# Patient Record
Sex: MALE | Race: ASIAN | NOT HISPANIC OR LATINO | URBAN - METROPOLITAN AREA
[De-identification: names, ages, dates, MRNs, and addresses within clinical notes are randomized per-mention and may not be internally consistent; named-entity substitution may affect disease eponyms.]

---

## 2020-10-22 ENCOUNTER — OUTPATIENT (OUTPATIENT)
Dept: OUTPATIENT SERVICES | Facility: HOSPITAL | Age: 55
LOS: 1 days | End: 2020-10-22
Payer: MEDICAID

## 2020-10-22 ENCOUNTER — APPOINTMENT (OUTPATIENT)
Dept: DISASTER EMERGENCY | Facility: CLINIC | Age: 55
End: 2020-10-22

## 2020-10-22 VITALS
OXYGEN SATURATION: 98 % | SYSTOLIC BLOOD PRESSURE: 153 MMHG | DIASTOLIC BLOOD PRESSURE: 84 MMHG | WEIGHT: 216.93 LBS | TEMPERATURE: 98 F | RESPIRATION RATE: 15 BRPM | HEART RATE: 81 BPM | HEIGHT: 72.5 IN

## 2020-10-22 DIAGNOSIS — S99.821A OTHER SPECIFIED INJURIES OF RIGHT FOOT, INITIAL ENCOUNTER: ICD-10-CM

## 2020-10-22 DIAGNOSIS — S90.851A SUPERFICIAL FOREIGN BODY, RIGHT FOOT, INITIAL ENCOUNTER: ICD-10-CM

## 2020-10-22 DIAGNOSIS — Z01.818 ENCOUNTER FOR OTHER PREPROCEDURAL EXAMINATION: ICD-10-CM

## 2020-10-22 LAB
ALBUMIN SERPL ELPH-MCNC: 3.9 G/DL — SIGNIFICANT CHANGE UP (ref 3.3–5)
ALP SERPL-CCNC: 54 U/L — SIGNIFICANT CHANGE UP (ref 40–120)
ALT FLD-CCNC: 24 U/L — SIGNIFICANT CHANGE UP (ref 12–78)
ANION GAP SERPL CALC-SCNC: 6 MMOL/L — SIGNIFICANT CHANGE UP (ref 5–17)
AST SERPL-CCNC: 15 U/L — SIGNIFICANT CHANGE UP (ref 15–37)
BILIRUB SERPL-MCNC: 0.5 MG/DL — SIGNIFICANT CHANGE UP (ref 0.2–1.2)
BUN SERPL-MCNC: 11 MG/DL — SIGNIFICANT CHANGE UP (ref 7–23)
CALCIUM SERPL-MCNC: 9.1 MG/DL — SIGNIFICANT CHANGE UP (ref 8.5–10.1)
CHLORIDE SERPL-SCNC: 106 MMOL/L — SIGNIFICANT CHANGE UP (ref 96–108)
CO2 SERPL-SCNC: 27 MMOL/L — SIGNIFICANT CHANGE UP (ref 22–31)
CREAT SERPL-MCNC: 0.94 MG/DL — SIGNIFICANT CHANGE UP (ref 0.5–1.3)
GLUCOSE SERPL-MCNC: 145 MG/DL — HIGH (ref 70–99)
HCT VFR BLD CALC: 44 % — SIGNIFICANT CHANGE UP (ref 39–50)
HGB BLD-MCNC: 15.3 G/DL — SIGNIFICANT CHANGE UP (ref 13–17)
MCHC RBC-ENTMCNC: 29.3 PG — SIGNIFICANT CHANGE UP (ref 27–34)
MCHC RBC-ENTMCNC: 34.8 GM/DL — SIGNIFICANT CHANGE UP (ref 32–36)
MCV RBC AUTO: 84.1 FL — SIGNIFICANT CHANGE UP (ref 80–100)
NRBC # BLD: 0 /100 WBCS — SIGNIFICANT CHANGE UP (ref 0–0)
PLATELET # BLD AUTO: 180 K/UL — SIGNIFICANT CHANGE UP (ref 150–400)
POTASSIUM SERPL-MCNC: 3.7 MMOL/L — SIGNIFICANT CHANGE UP (ref 3.5–5.3)
POTASSIUM SERPL-SCNC: 3.7 MMOL/L — SIGNIFICANT CHANGE UP (ref 3.5–5.3)
PROT SERPL-MCNC: 8 G/DL — SIGNIFICANT CHANGE UP (ref 6–8.3)
RBC # BLD: 5.23 M/UL — SIGNIFICANT CHANGE UP (ref 4.2–5.8)
RBC # FLD: 12.6 % — SIGNIFICANT CHANGE UP (ref 10.3–14.5)
SODIUM SERPL-SCNC: 139 MMOL/L — SIGNIFICANT CHANGE UP (ref 135–145)
WBC # BLD: 9.17 K/UL — SIGNIFICANT CHANGE UP (ref 3.8–10.5)
WBC # FLD AUTO: 9.17 K/UL — SIGNIFICANT CHANGE UP (ref 3.8–10.5)

## 2020-10-22 PROCEDURE — 36415 COLL VENOUS BLD VENIPUNCTURE: CPT

## 2020-10-22 PROCEDURE — 85027 COMPLETE CBC AUTOMATED: CPT

## 2020-10-22 PROCEDURE — 93005 ELECTROCARDIOGRAM TRACING: CPT

## 2020-10-22 PROCEDURE — 83036 HEMOGLOBIN GLYCOSYLATED A1C: CPT

## 2020-10-22 PROCEDURE — G0463: CPT

## 2020-10-22 PROCEDURE — 93010 ELECTROCARDIOGRAM REPORT: CPT

## 2020-10-22 PROCEDURE — 80053 COMPREHEN METABOLIC PANEL: CPT

## 2020-10-22 NOTE — H&P PST ADULT - ASSESSMENT
56 yo M for removal foreign body right foot     Medical clearance pending 56 yo M for removal foreign body right foot     Medical clearance pending   Addendum: medically optimized. Scheduled for removal of foreign body from right foot with Dr Wagner on 10/28/20.

## 2020-10-22 NOTE — H&P PST ADULT - NSICDXPROBLEM_GEN_ALL_CORE_FT
PROBLEM DIAGNOSES  Problem: Foreign body in right foot  Assessment and Plan: for removal of FB

## 2020-10-23 PROBLEM — E11.9 TYPE 2 DIABETES MELLITUS WITHOUT COMPLICATIONS: Chronic | Status: ACTIVE | Noted: 2020-10-22

## 2020-10-23 PROBLEM — T14.8XXA OTHER INJURY OF UNSPECIFIED BODY REGION, INITIAL ENCOUNTER: Chronic | Status: ACTIVE | Noted: 2020-10-22

## 2020-10-23 LAB
A1C WITH ESTIMATED AVERAGE GLUCOSE RESULT: 7 % — HIGH (ref 4–5.6)
ESTIMATED AVERAGE GLUCOSE: 154 MG/DL — HIGH (ref 68–114)
SARS-COV-2 N GENE NPH QL NAA+PROBE: NOT DETECTED

## 2020-10-25 ENCOUNTER — APPOINTMENT (OUTPATIENT)
Dept: DISASTER EMERGENCY | Facility: CLINIC | Age: 55
End: 2020-10-25

## 2020-10-26 LAB — SARS-COV-2 N GENE NPH QL NAA+PROBE: NOT DETECTED

## 2020-10-27 ENCOUNTER — TRANSCRIPTION ENCOUNTER (OUTPATIENT)
Age: 55
End: 2020-10-27

## 2020-10-28 ENCOUNTER — RESULT REVIEW (OUTPATIENT)
Age: 55
End: 2020-10-28

## 2020-10-28 ENCOUNTER — OUTPATIENT (OUTPATIENT)
Dept: OUTPATIENT SERVICES | Facility: HOSPITAL | Age: 55
LOS: 1 days | End: 2020-10-28
Payer: MEDICAID

## 2020-10-28 VITALS
HEIGHT: 72 IN | SYSTOLIC BLOOD PRESSURE: 168 MMHG | DIASTOLIC BLOOD PRESSURE: 86 MMHG | TEMPERATURE: 98 F | WEIGHT: 220.02 LBS | OXYGEN SATURATION: 100 % | HEART RATE: 92 BPM | RESPIRATION RATE: 14 BRPM

## 2020-10-28 VITALS
OXYGEN SATURATION: 96 % | DIASTOLIC BLOOD PRESSURE: 77 MMHG | HEART RATE: 75 BPM | TEMPERATURE: 99 F | RESPIRATION RATE: 16 BRPM | SYSTOLIC BLOOD PRESSURE: 114 MMHG

## 2020-10-28 DIAGNOSIS — S99.821A OTHER SPECIFIED INJURIES OF RIGHT FOOT, INITIAL ENCOUNTER: ICD-10-CM

## 2020-10-28 DIAGNOSIS — S90.851A SUPERFICIAL FOREIGN BODY, RIGHT FOOT, INITIAL ENCOUNTER: ICD-10-CM

## 2020-10-28 PROCEDURE — 88300 SURGICAL PATH GROSS: CPT | Mod: 26

## 2020-10-28 PROCEDURE — 10120 INC&RMVL FB SUBQ TISS SMPL: CPT

## 2020-10-28 PROCEDURE — 88300 SURGICAL PATH GROSS: CPT

## 2020-10-28 PROCEDURE — 82962 GLUCOSE BLOOD TEST: CPT

## 2020-10-28 PROCEDURE — 76000 FLUOROSCOPY <1 HR PHYS/QHP: CPT

## 2020-10-28 RX ORDER — HYDROMORPHONE HYDROCHLORIDE 2 MG/ML
0.5 INJECTION INTRAMUSCULAR; INTRAVENOUS; SUBCUTANEOUS
Refills: 0 | Status: DISCONTINUED | OUTPATIENT
Start: 2020-10-28 | End: 2020-10-28

## 2020-10-28 RX ORDER — SODIUM CHLORIDE 9 MG/ML
1000 INJECTION, SOLUTION INTRAVENOUS
Refills: 0 | Status: DISCONTINUED | OUTPATIENT
Start: 2020-10-28 | End: 2020-10-28

## 2020-10-28 RX ORDER — OXYCODONE HYDROCHLORIDE 5 MG/1
5 TABLET ORAL ONCE
Refills: 0 | Status: DISCONTINUED | OUTPATIENT
Start: 2020-10-28 | End: 2020-10-28

## 2020-10-28 RX ORDER — SODIUM CHLORIDE 9 MG/ML
1000 INJECTION, SOLUTION INTRAVENOUS
Refills: 0 | Status: DISCONTINUED | OUTPATIENT
Start: 2020-10-28 | End: 2020-11-11

## 2020-10-28 RX ORDER — CEFAZOLIN SODIUM 1 G
2000 VIAL (EA) INJECTION ONCE
Refills: 0 | Status: DISCONTINUED | OUTPATIENT
Start: 2020-10-28 | End: 2020-10-28

## 2020-10-28 RX ORDER — ONDANSETRON 8 MG/1
4 TABLET, FILM COATED ORAL ONCE
Refills: 0 | Status: DISCONTINUED | OUTPATIENT
Start: 2020-10-28 | End: 2020-10-28

## 2020-10-28 RX ADMIN — SODIUM CHLORIDE 75 MILLILITER(S): 9 INJECTION, SOLUTION INTRAVENOUS at 07:13

## 2020-10-28 RX ADMIN — SODIUM CHLORIDE 75 MILLILITER(S): 9 INJECTION, SOLUTION INTRAVENOUS at 13:03

## 2020-10-28 NOTE — BRIEF OPERATIVE NOTE - COMMENTS
Patient tolerated procedure and anesthesia well and was transferred to the recovery room with vital signs and neurovascular status intact to the right foot. Patient to be discharged to home when stable per anesthesia

## 2020-10-28 NOTE — ASU DISCHARGE PLAN (ADULT/PEDIATRIC) - CALL YOUR DOCTOR IF YOU HAVE ANY OF THE FOLLOWING:
Bleeding that does not stop/Pain not relieved by Medications/Fever greater than (need to indicate Fahrenheit or Celsius)/Wound/Surgical Site with redness, or foul smelling discharge or pus/Numbness, tingling, color or temperature change to extremity/Nausea and vomiting that does not stop/Excessive diarrhea

## 2020-10-28 NOTE — ASU DISCHARGE PLAN (ADULT/PEDIATRIC) - ASU DC SPECIAL INSTRUCTIONSFT
-Patient is to follow up on 11/2/20- Monday in Dr. Wagner office in [place]  - Patient is to remain weight bearing as tolerated in surgical shoe to the right foot  -Patient is to take Rx Cefadroxil 500mg QID x10 days and Ultram 50mg which will be sent electronically by Dr. Wagner to the patient's pharmacy  - Patient is to report to the ED if any signs of systemic infection arise and persist including but not limited to nausea, vomiting, fever  -Patient is to keep the dressing clean, dry and intact to the right lower extremity until first follow up appointment

## 2020-10-28 NOTE — ASU DISCHARGE PLAN (ADULT/PEDIATRIC) - CARE PROVIDER_API CALL
Sebas Wagner  Podiatric Medicine and Surgery  [address] [place] [address], [address]  [place] [address]  Phone: [phone]  Fax: [phone]  Follow Up Time: 1 week

## 2021-03-02 ENCOUNTER — INPATIENT (INPATIENT)
Facility: HOSPITAL | Age: 56
LOS: 2 days | Discharge: HOME | DRG: 247 | End: 2021-03-05
Attending: INTERNAL MEDICINE | Admitting: INTERNAL MEDICINE
Payer: MEDICAID

## 2021-03-02 VITALS
SYSTOLIC BLOOD PRESSURE: 160 MMHG | TEMPERATURE: 98 F | RESPIRATION RATE: 20 BRPM | WEIGHT: 220.9 LBS | HEART RATE: 106 BPM | OXYGEN SATURATION: 100 % | DIASTOLIC BLOOD PRESSURE: 91 MMHG

## 2021-03-02 DIAGNOSIS — I20.0 UNSTABLE ANGINA: ICD-10-CM

## 2021-03-02 LAB
ALBUMIN SERPL ELPH-MCNC: 4.6 G/DL — SIGNIFICANT CHANGE UP (ref 3.3–5)
ALP SERPL-CCNC: 50 U/L — SIGNIFICANT CHANGE UP (ref 40–120)
ALT FLD-CCNC: 13 U/L — SIGNIFICANT CHANGE UP (ref 10–45)
ANION GAP SERPL CALC-SCNC: 13 MMOL/L — SIGNIFICANT CHANGE UP (ref 5–17)
APTT BLD: 30.6 SEC — SIGNIFICANT CHANGE UP (ref 27.5–35.5)
AST SERPL-CCNC: 13 U/L — SIGNIFICANT CHANGE UP (ref 10–40)
BASOPHILS # BLD AUTO: 0.04 K/UL — SIGNIFICANT CHANGE UP (ref 0–0.2)
BASOPHILS NFR BLD AUTO: 0.5 % — SIGNIFICANT CHANGE UP (ref 0–2)
BILIRUB SERPL-MCNC: 0.4 MG/DL — SIGNIFICANT CHANGE UP (ref 0.2–1.2)
BLD GP AB SCN SERPL QL: NEGATIVE — SIGNIFICANT CHANGE UP
BUN SERPL-MCNC: 7 MG/DL — SIGNIFICANT CHANGE UP (ref 7–23)
CALCIUM SERPL-MCNC: 10.3 MG/DL — SIGNIFICANT CHANGE UP (ref 8.4–10.5)
CHLORIDE SERPL-SCNC: 100 MMOL/L — SIGNIFICANT CHANGE UP (ref 96–108)
CK MB CFR SERPL CALC: 2.4 NG/ML — SIGNIFICANT CHANGE UP (ref 0–6.7)
CK SERPL-CCNC: 57 U/L — SIGNIFICANT CHANGE UP (ref 30–200)
CO2 SERPL-SCNC: 26 MMOL/L — SIGNIFICANT CHANGE UP (ref 22–31)
CREAT SERPL-MCNC: 0.86 MG/DL — SIGNIFICANT CHANGE UP (ref 0.5–1.3)
EOSINOPHIL # BLD AUTO: 0.14 K/UL — SIGNIFICANT CHANGE UP (ref 0–0.5)
EOSINOPHIL NFR BLD AUTO: 1.6 % — SIGNIFICANT CHANGE UP (ref 0–6)
GLUCOSE BLDC GLUCOMTR-MCNC: 167 MG/DL — HIGH (ref 70–99)
GLUCOSE BLDC GLUCOMTR-MCNC: 184 MG/DL — HIGH (ref 70–99)
GLUCOSE SERPL-MCNC: 151 MG/DL — HIGH (ref 70–99)
HCT VFR BLD CALC: 44.6 % — SIGNIFICANT CHANGE UP (ref 39–50)
HGB BLD-MCNC: 14.8 G/DL — SIGNIFICANT CHANGE UP (ref 13–17)
IMM GRANULOCYTES NFR BLD AUTO: 0.1 % — SIGNIFICANT CHANGE UP (ref 0–1.5)
INR BLD: 1.03 RATIO — SIGNIFICANT CHANGE UP (ref 0.88–1.16)
LYMPHOCYTES # BLD AUTO: 2.54 K/UL — SIGNIFICANT CHANGE UP (ref 1–3.3)
LYMPHOCYTES # BLD AUTO: 29.8 % — SIGNIFICANT CHANGE UP (ref 13–44)
MCHC RBC-ENTMCNC: 28.7 PG — SIGNIFICANT CHANGE UP (ref 27–34)
MCHC RBC-ENTMCNC: 33.2 GM/DL — SIGNIFICANT CHANGE UP (ref 32–36)
MCV RBC AUTO: 86.4 FL — SIGNIFICANT CHANGE UP (ref 80–100)
MONOCYTES # BLD AUTO: 0.68 K/UL — SIGNIFICANT CHANGE UP (ref 0–0.9)
MONOCYTES NFR BLD AUTO: 8 % — SIGNIFICANT CHANGE UP (ref 2–14)
NEUTROPHILS # BLD AUTO: 5.1 K/UL — SIGNIFICANT CHANGE UP (ref 1.8–7.4)
NEUTROPHILS NFR BLD AUTO: 60 % — SIGNIFICANT CHANGE UP (ref 43–77)
NRBC # BLD: 0 /100 WBCS — SIGNIFICANT CHANGE UP (ref 0–0)
PLATELET # BLD AUTO: 144 K/UL — LOW (ref 150–400)
POTASSIUM SERPL-MCNC: 3.5 MMOL/L — SIGNIFICANT CHANGE UP (ref 3.5–5.3)
POTASSIUM SERPL-SCNC: 3.5 MMOL/L — SIGNIFICANT CHANGE UP (ref 3.5–5.3)
PROT SERPL-MCNC: 7.5 G/DL — SIGNIFICANT CHANGE UP (ref 6–8.3)
PROTHROM AB SERPL-ACNC: 12.3 SEC — SIGNIFICANT CHANGE UP (ref 10.6–13.6)
RBC # BLD: 5.16 M/UL — SIGNIFICANT CHANGE UP (ref 4.2–5.8)
RBC # FLD: 12.5 % — SIGNIFICANT CHANGE UP (ref 10.3–14.5)
RH IG SCN BLD-IMP: POSITIVE — SIGNIFICANT CHANGE UP
SARS-COV-2 RNA SPEC QL NAA+PROBE: SIGNIFICANT CHANGE UP
SODIUM SERPL-SCNC: 139 MMOL/L — SIGNIFICANT CHANGE UP (ref 135–145)
TROPONIN T, HIGH SENSITIVITY RESULT: 21 NG/L — SIGNIFICANT CHANGE UP (ref 0–51)
WBC # BLD: 8.51 K/UL — SIGNIFICANT CHANGE UP (ref 3.8–10.5)
WBC # FLD AUTO: 8.51 K/UL — SIGNIFICANT CHANGE UP (ref 3.8–10.5)

## 2021-03-02 PROCEDURE — 93010 ELECTROCARDIOGRAM REPORT: CPT | Mod: 59

## 2021-03-02 PROCEDURE — 93458 L HRT ARTERY/VENTRICLE ANGIO: CPT | Mod: 26

## 2021-03-02 PROCEDURE — 71045 X-RAY EXAM CHEST 1 VIEW: CPT | Mod: 26

## 2021-03-02 PROCEDURE — 99152 MOD SED SAME PHYS/QHP 5/>YRS: CPT

## 2021-03-02 PROCEDURE — 93010 ELECTROCARDIOGRAM REPORT: CPT

## 2021-03-02 PROCEDURE — 99222 1ST HOSP IP/OBS MODERATE 55: CPT

## 2021-03-02 PROCEDURE — 99285 EMERGENCY DEPT VISIT HI MDM: CPT

## 2021-03-02 RX ORDER — DEXTROSE 50 % IN WATER 50 %
25 SYRINGE (ML) INTRAVENOUS ONCE
Refills: 0 | Status: DISCONTINUED | OUTPATIENT
Start: 2021-03-02 | End: 2021-03-03

## 2021-03-02 RX ORDER — DEXTROSE 50 % IN WATER 50 %
12.5 SYRINGE (ML) INTRAVENOUS ONCE
Refills: 0 | Status: DISCONTINUED | OUTPATIENT
Start: 2021-03-02 | End: 2021-03-03

## 2021-03-02 RX ORDER — GLUCAGON INJECTION, SOLUTION 0.5 MG/.1ML
1 INJECTION, SOLUTION SUBCUTANEOUS ONCE
Refills: 0 | Status: DISCONTINUED | OUTPATIENT
Start: 2021-03-02 | End: 2021-03-03

## 2021-03-02 RX ORDER — ATORVASTATIN CALCIUM 80 MG/1
40 TABLET, FILM COATED ORAL AT BEDTIME
Refills: 0 | Status: DISCONTINUED | OUTPATIENT
Start: 2021-03-02 | End: 2021-03-05

## 2021-03-02 RX ORDER — ASPIRIN/CALCIUM CARB/MAGNESIUM 324 MG
81 TABLET ORAL DAILY
Refills: 0 | Status: DISCONTINUED | OUTPATIENT
Start: 2021-03-02 | End: 2021-03-05

## 2021-03-02 RX ORDER — SODIUM CHLORIDE 9 MG/ML
1000 INJECTION, SOLUTION INTRAVENOUS
Refills: 0 | Status: DISCONTINUED | OUTPATIENT
Start: 2021-03-02 | End: 2021-03-03

## 2021-03-02 RX ORDER — INSULIN LISPRO 100/ML
VIAL (ML) SUBCUTANEOUS AT BEDTIME
Refills: 0 | Status: DISCONTINUED | OUTPATIENT
Start: 2021-03-02 | End: 2021-03-03

## 2021-03-02 RX ORDER — INFLUENZA VIRUS VACCINE 15; 15; 15; 15 UG/.5ML; UG/.5ML; UG/.5ML; UG/.5ML
0.5 SUSPENSION INTRAMUSCULAR ONCE
Refills: 0 | Status: COMPLETED | OUTPATIENT
Start: 2021-03-02 | End: 2021-03-02

## 2021-03-02 RX ORDER — PANTOPRAZOLE SODIUM 20 MG/1
40 TABLET, DELAYED RELEASE ORAL
Refills: 0 | Status: DISCONTINUED | OUTPATIENT
Start: 2021-03-02 | End: 2021-03-05

## 2021-03-02 RX ORDER — DEXTROSE 50 % IN WATER 50 %
15 SYRINGE (ML) INTRAVENOUS ONCE
Refills: 0 | Status: DISCONTINUED | OUTPATIENT
Start: 2021-03-02 | End: 2021-03-03

## 2021-03-02 RX ORDER — FAMOTIDINE 10 MG/ML
20 INJECTION INTRAVENOUS DAILY
Refills: 0 | Status: DISCONTINUED | OUTPATIENT
Start: 2021-03-02 | End: 2021-03-05

## 2021-03-02 RX ORDER — ASPIRIN/CALCIUM CARB/MAGNESIUM 324 MG
162 TABLET ORAL ONCE
Refills: 0 | Status: COMPLETED | OUTPATIENT
Start: 2021-03-02 | End: 2021-03-02

## 2021-03-02 RX ADMIN — Medication 162 MILLIGRAM(S): at 16:42

## 2021-03-02 RX ADMIN — ATORVASTATIN CALCIUM 40 MILLIGRAM(S): 80 TABLET, FILM COATED ORAL at 21:41

## 2021-03-02 NOTE — ED PROVIDER NOTE - NS ED ROS FT
GENERAL: No fever or chills, EYES: no change in vision, HEENT: no trouble swallowing or speaking, CARDIAC: no chest pain, PULMONARY: no cough or SOB, GI: no abdominal pain, no nausea, no vomiting, no diarrhea or constipation, : No changes in urination, SKIN: no rashes, NEURO: no headache,  MSK: No joint pain ~Jan Rios M.D. Resident

## 2021-03-02 NOTE — H&P CARDIOLOGY - HISTORY OF PRESENT ILLNESS
56 yo M with PMhx DM presented to ED after being sent by his cardiologist Dr Tamia Khan for EKG changes. Patient has been c/o chest pain for 3 weeks and attributed it to heart burn stating it was relieved by pepcid.     No fever, chills, No N/V/D or abdominal pain. COVID negative 3/2 .  56 yo M with PMhx DM presented to ED after being sent by his cardiologist Dr Tamia Khan for EKG changes. Patient has been c/o chest pain for 3 weeks and attributed it to heart burn stating it was relieved by pepcid.  He was recently on Z-pack for fever chills and congestion, He states he was tested for COVID x 3 and all were negative .  He followed with  for his CP  heart burn and said his EKG was abnormal and was sent to the ED.     No fever, chills, No N/V/D or abdominal pain. COVID negative 3/2 .

## 2021-03-02 NOTE — ED PROVIDER NOTE - PHYSICAL EXAMINATION
Gen: AAOx3, non-toxic  Head: NCAT  HEENT: EOMI, oral mucosa moist, normal conjunctiva  Lung: CTAB, no respiratory distress, no wheezes/rhonchi/rales B/L, speaking in full sentences  CV: RRR, no murmurs, rubs or gallops  Abd: soft, NTND, no guarding  MSK: no visible deformities  Neuro: No focal sensory or motor deficits  Skin: Warm, well perfused, no rash  Psych: normal affect.   ~Jan Rios M.D. Resident

## 2021-03-02 NOTE — ED PROVIDER NOTE - OBJECTIVE STATEMENT
ID: [id number]  55yM Cristian speaking h/o diabetes presents from outpatient cardiology (  [phone]) for abnormal EKG. YESSENIA in II/III/AVF. Patient states that he has been having heart burn for the past 3 weeks worsening with food in which have been improving with pepcid. Presented to Dr. Nieto office for routine check up to make sure everything was fine. No chest pain  ID: [id number]  55yM Cristian speaking h/o diabetes presents from outpatient cardiology (  [phone]) for abnormal EKG. YESSENIA in II/III/AVF. Patient states that he has been having heart burn for the past 3 weeks worsening with food in which have been improving with pepcid. Presented to Dr. Nieto office for routine check up to make sure everything was fine. No chest pain,, abd pain, sob, back pain, urinary or bowel irregularities.

## 2021-03-02 NOTE — ED PROVIDER NOTE - PROGRESS NOTE DETAILS
Gabriel HDZ: Spoke with  who states that he spoke with Dr Peter and recommended patient to come to the ED for Cath,

## 2021-03-02 NOTE — ED ADULT NURSE NOTE - OBJECTIVE STATEMENT
55 y.o male pmh DM, GERD, presents to ED for abnormal EKG. Per pt, he has been having routine outpatient visits for his "stomach aches" and had abnormal EKG and sent to ED. CM applied, EKG done showing possible inferior wall MI. Denies CP, SOB, n/v/d, fevers, chills, abdominal pain, urinary symptoms, weakness, fatigue, numbness, tingling in upper and lower extremities, HA, blurry vision. Updated on plan of care.

## 2021-03-02 NOTE — ED PROVIDER NOTE - CLINICAL SUMMARY MEDICAL DECISION MAKING FREE TEXT BOX
Impression:  patient's presentation mandates workup as ACS until proven otherwise (heartburn + abnormal ECG that is significantly different from prior on file fro m10/22/2020).  Patient's ECG does not meet STEMI criteria, but he will likely need admit for further cardiac workup.  Plan:  cardiac labs, ASA, INR, CXR, reassess.   COVID swab.

## 2021-03-02 NOTE — ED PROVIDER NOTE - ATTENDING CONTRIBUTION TO CARE
MD Casiano:  patient seen and evaluated with the resident.  I was present for key portions of the History & Physical, and I agree with the Impression & Plan.  MD Casiano:  56 yo M, sent to ED by outside provider for eval abdominal pain (heartburn) + abnormal ECG.  Patient was sent to ED with old ECG from 10/22/2020, and there is significant interval change.  Associated Sx: no CP/SOB, no n/v/d  Quality: "heartburn."  Duration 3wks  Modifiers: POs; no change in symptoms with exertion.  VS: wnl.  Physical Exam: adult M, NAD, NCAT, PERRL, EOMI, neck supple, RRR, CTA B, Abd: s/nd/nt, Ext: no edema, Neuro: AAOx3, ambulates w/o diff, strength 5/5 & symmetric throughout.  ECG:  +qwaves inferiorly, with associated deep TWI.    Impression:  patient's presentation mandates workup as ACS until proven otherwise (heartburn + abnormal ECG that is significantly different from prior on file fro m10/22/2020).  Patient's ECG does not meet STEMI criteria, but he will likely need admit for further cardiac workup.  Plan:  cardiac labs, ASA, INR, CXR, reassess.   COVID swab.

## 2021-03-02 NOTE — ED ADULT NURSE NOTE - NSIMPLEMENTINTERV_GEN_ALL_ED
Implemented All Universal Safety Interventions:  [place] to call system. Call bell, personal items and telephone within reach. Instruct patient to call for assistance. Room bathroom lighting operational. Non-slip footwear when patient is off stretcher. Physically safe environment: no spills, clutter or unnecessary equipment. Stretcher in lowest position, wheels locked, appropriate side rails in place.

## 2021-03-03 LAB
A1C WITH ESTIMATED AVERAGE GLUCOSE RESULT: 6.9 % — HIGH (ref 4–5.6)
ANION GAP SERPL CALC-SCNC: 15 MMOL/L — SIGNIFICANT CHANGE UP (ref 5–17)
BUN SERPL-MCNC: 6 MG/DL — LOW (ref 7–23)
CALCIUM SERPL-MCNC: 9.9 MG/DL — SIGNIFICANT CHANGE UP (ref 8.4–10.5)
CHLORIDE SERPL-SCNC: 99 MMOL/L — SIGNIFICANT CHANGE UP (ref 96–108)
CHOLEST SERPL-MCNC: 194 MG/DL — SIGNIFICANT CHANGE UP
CO2 SERPL-SCNC: 24 MMOL/L — SIGNIFICANT CHANGE UP (ref 22–31)
CREAT SERPL-MCNC: 0.69 MG/DL — SIGNIFICANT CHANGE UP (ref 0.5–1.3)
ESTIMATED AVERAGE GLUCOSE: 151 MG/DL — HIGH (ref 68–114)
GLUCOSE BLDC GLUCOMTR-MCNC: 148 MG/DL — HIGH (ref 70–99)
GLUCOSE BLDC GLUCOMTR-MCNC: 165 MG/DL — HIGH (ref 70–99)
GLUCOSE BLDC GLUCOMTR-MCNC: 170 MG/DL — HIGH (ref 70–99)
GLUCOSE BLDC GLUCOMTR-MCNC: 193 MG/DL — HIGH (ref 70–99)
GLUCOSE BLDC GLUCOMTR-MCNC: 196 MG/DL — HIGH (ref 70–99)
GLUCOSE SERPL-MCNC: 150 MG/DL — HIGH (ref 70–99)
HCT VFR BLD CALC: 43.1 % — SIGNIFICANT CHANGE UP (ref 39–50)
HDLC SERPL-MCNC: 37 MG/DL — LOW
HGB BLD-MCNC: 14.2 G/DL — SIGNIFICANT CHANGE UP (ref 13–17)
LIPID PNL WITH DIRECT LDL SERPL: 134 MG/DL — HIGH
MCHC RBC-ENTMCNC: 28.4 PG — SIGNIFICANT CHANGE UP (ref 27–34)
MCHC RBC-ENTMCNC: 32.9 GM/DL — SIGNIFICANT CHANGE UP (ref 32–36)
MCV RBC AUTO: 86.2 FL — SIGNIFICANT CHANGE UP (ref 80–100)
NON HDL CHOLESTEROL: 157 MG/DL — HIGH
NRBC # BLD: 0 /100 WBCS — SIGNIFICANT CHANGE UP (ref 0–0)
PLATELET # BLD AUTO: 125 K/UL — LOW (ref 150–400)
POTASSIUM SERPL-MCNC: 3.6 MMOL/L — SIGNIFICANT CHANGE UP (ref 3.5–5.3)
POTASSIUM SERPL-SCNC: 3.6 MMOL/L — SIGNIFICANT CHANGE UP (ref 3.5–5.3)
RBC # BLD: 5 M/UL — SIGNIFICANT CHANGE UP (ref 4.2–5.8)
RBC # FLD: 12.5 % — SIGNIFICANT CHANGE UP (ref 10.3–14.5)
SODIUM SERPL-SCNC: 138 MMOL/L — SIGNIFICANT CHANGE UP (ref 135–145)
TRIGL SERPL-MCNC: 114 MG/DL — SIGNIFICANT CHANGE UP
WBC # BLD: 8.87 K/UL — SIGNIFICANT CHANGE UP (ref 3.8–10.5)
WBC # FLD AUTO: 8.87 K/UL — SIGNIFICANT CHANGE UP (ref 3.8–10.5)

## 2021-03-03 PROCEDURE — 93306 TTE W/DOPPLER COMPLETE: CPT | Mod: 26

## 2021-03-03 RX ORDER — METOPROLOL TARTRATE 50 MG
12.5 TABLET ORAL
Refills: 0 | Status: DISCONTINUED | OUTPATIENT
Start: 2021-03-03 | End: 2021-03-05

## 2021-03-03 RX ORDER — GLUCAGON INJECTION, SOLUTION 0.5 MG/.1ML
1 INJECTION, SOLUTION SUBCUTANEOUS ONCE
Refills: 0 | Status: DISCONTINUED | OUTPATIENT
Start: 2021-03-03 | End: 2021-03-05

## 2021-03-03 RX ORDER — DEXTROSE 50 % IN WATER 50 %
25 SYRINGE (ML) INTRAVENOUS ONCE
Refills: 0 | Status: DISCONTINUED | OUTPATIENT
Start: 2021-03-03 | End: 2021-03-05

## 2021-03-03 RX ORDER — INSULIN LISPRO 100/ML
VIAL (ML) SUBCUTANEOUS AT BEDTIME
Refills: 0 | Status: DISCONTINUED | OUTPATIENT
Start: 2021-03-03 | End: 2021-03-05

## 2021-03-03 RX ORDER — TICAGRELOR 90 MG/1
90 TABLET ORAL EVERY 12 HOURS
Refills: 0 | Status: DISCONTINUED | OUTPATIENT
Start: 2021-03-03 | End: 2021-03-05

## 2021-03-03 RX ORDER — DEXTROSE 50 % IN WATER 50 %
12.5 SYRINGE (ML) INTRAVENOUS ONCE
Refills: 0 | Status: DISCONTINUED | OUTPATIENT
Start: 2021-03-03 | End: 2021-03-05

## 2021-03-03 RX ORDER — FAMOTIDINE 10 MG/ML
1 INJECTION INTRAVENOUS
Qty: 0 | Refills: 0 | DISCHARGE

## 2021-03-03 RX ORDER — SODIUM CHLORIDE 9 MG/ML
1000 INJECTION, SOLUTION INTRAVENOUS
Refills: 0 | Status: DISCONTINUED | OUTPATIENT
Start: 2021-03-03 | End: 2021-03-05

## 2021-03-03 RX ORDER — INSULIN LISPRO 100/ML
VIAL (ML) SUBCUTANEOUS
Refills: 0 | Status: DISCONTINUED | OUTPATIENT
Start: 2021-03-03 | End: 2021-03-05

## 2021-03-03 RX ORDER — DEXTROSE 50 % IN WATER 50 %
15 SYRINGE (ML) INTRAVENOUS ONCE
Refills: 0 | Status: DISCONTINUED | OUTPATIENT
Start: 2021-03-03 | End: 2021-03-05

## 2021-03-03 RX ADMIN — FAMOTIDINE 20 MILLIGRAM(S): 10 INJECTION INTRAVENOUS at 11:02

## 2021-03-03 RX ADMIN — Medication 81 MILLIGRAM(S): at 05:24

## 2021-03-03 RX ADMIN — PANTOPRAZOLE SODIUM 40 MILLIGRAM(S): 20 TABLET, DELAYED RELEASE ORAL at 05:25

## 2021-03-03 RX ADMIN — Medication 12.5 MILLIGRAM(S): at 17:05

## 2021-03-03 RX ADMIN — Medication 12.5 MILLIGRAM(S): at 11:03

## 2021-03-03 RX ADMIN — Medication 1: at 11:43

## 2021-03-03 RX ADMIN — TICAGRELOR 90 MILLIGRAM(S): 90 TABLET ORAL at 21:06

## 2021-03-03 RX ADMIN — TICAGRELOR 90 MILLIGRAM(S): 90 TABLET ORAL at 11:03

## 2021-03-03 RX ADMIN — ATORVASTATIN CALCIUM 40 MILLIGRAM(S): 80 TABLET, FILM COATED ORAL at 21:06

## 2021-03-03 RX ADMIN — Medication 1: at 16:42

## 2021-03-04 ENCOUNTER — TRANSCRIPTION ENCOUNTER (OUTPATIENT)
Age: 56
End: 2021-03-04

## 2021-03-04 LAB
GLUCOSE BLDC GLUCOMTR-MCNC: 141 MG/DL — HIGH (ref 70–99)
GLUCOSE BLDC GLUCOMTR-MCNC: 162 MG/DL — HIGH (ref 70–99)
GLUCOSE BLDC GLUCOMTR-MCNC: 191 MG/DL — HIGH (ref 70–99)
GLUCOSE BLDC GLUCOMTR-MCNC: 280 MG/DL — HIGH (ref 70–99)

## 2021-03-04 PROCEDURE — 92928 PRQ TCAT PLMT NTRAC ST 1 LES: CPT | Mod: RC

## 2021-03-04 PROCEDURE — 93010 ELECTROCARDIOGRAM REPORT: CPT

## 2021-03-04 PROCEDURE — 92978 ENDOLUMINL IVUS OCT C 1ST: CPT | Mod: 26,RC

## 2021-03-04 PROCEDURE — 99152 MOD SED SAME PHYS/QHP 5/>YRS: CPT

## 2021-03-04 PROCEDURE — 78452 HT MUSCLE IMAGE SPECT MULT: CPT | Mod: 26

## 2021-03-04 RX ADMIN — PANTOPRAZOLE SODIUM 40 MILLIGRAM(S): 20 TABLET, DELAYED RELEASE ORAL at 05:42

## 2021-03-04 RX ADMIN — Medication 12.5 MILLIGRAM(S): at 21:20

## 2021-03-04 RX ADMIN — ATORVASTATIN CALCIUM 40 MILLIGRAM(S): 80 TABLET, FILM COATED ORAL at 21:21

## 2021-03-04 RX ADMIN — FAMOTIDINE 20 MILLIGRAM(S): 10 INJECTION INTRAVENOUS at 11:03

## 2021-03-04 RX ADMIN — Medication 81 MILLIGRAM(S): at 05:42

## 2021-03-04 RX ADMIN — Medication 3: at 11:36

## 2021-03-04 RX ADMIN — Medication 1: at 07:32

## 2021-03-04 RX ADMIN — TICAGRELOR 90 MILLIGRAM(S): 90 TABLET ORAL at 11:04

## 2021-03-04 RX ADMIN — Medication 12.5 MILLIGRAM(S): at 05:42

## 2021-03-04 RX ADMIN — TICAGRELOR 90 MILLIGRAM(S): 90 TABLET ORAL at 21:22

## 2021-03-04 NOTE — CONSULT NOTE ADULT - PROBLEM SELECTOR RECOMMENDATION 9
Will adjust diet orders to be consistent carb.  Will start Lantus 6u at bedtime.  Will start Admelog 4u before each meal and continue Admelog correction scale coverage. Will continue monitoring FS and FU.  Patient counseled for compliance with consistent low carb diet and exercise as tolerated outpatient.

## 2021-03-04 NOTE — CONSULT NOTE ADULT - ASSESSMENT
Assessment  DMT2: 55y Male with DM T2 with hyperglycemia, A1C 6.9%, was on oral meds/Metformin at home, admitted with chest pain, now on insulin coverage, blood sugars are fluctuating/running high and not at target, no hypoglycemic episodes. Patient appears comfortable and in no acute distress, for PCI today, reports eating breakfast but not lunch, diet orders noted to be nondiabetic.  CAD: on medications, no chest pain, stable, monitored.  HTN: Controlled,  on antihypertensive medications.  HLD: On statin    Sixto Khan MD  Cell: [phone]  Office: [phone]       Assessment  DMT2: 55y Male with DM T2 with hyperglycemia, A1C 6.9%, was on oral meds/Metformin at home, admitted with chest pain, now on insulin coverage,  blood sugars are fluctuating/running high and not at target, no hypoglycemic episodes. Patient appears comfortable and in no acute distress, for PCI today, reports eating breakfast but not lunch, diet orders noted to be nondiabetic.  CAD: on medications, no chest pain, stable, monitored.  HTN: Controlled,  on antihypertensive medications.  HLD: On statin    Sixto Khan MD  Cell: [phone]  Office: [phone]

## 2021-03-04 NOTE — CONSULT NOTE ADULT - SUBJECTIVE AND OBJECTIVE BOX
HPI:  56 yo M with PMhx DM presented to ED after being sent by his cardiologist Dr Tamia Khan for EKG changes. Patient has been c/o chest pain for 3 weeks and attributed it to heart burn stating it was relieved by pepcid.  He was recently on Z-pack for fever chills and congestion, He states he was tested for COVID x 3 and all were negative .  He followed with  for his CP  heart burn and said his EKG was abnormal and was sent to the ED.     No fever, chills, No N/V/D or abdominal pain. COVID negative 3/2 .  (02 Mar 2021 18:39)    Patient has history of diabetes, A1C 6.9%, on oral meds at home (Metformin 850mg BID), reports well-controlled blood sugars, no recent hypoglycemic episodes, no polyuria polydipsia. Patient follows up with PCP for diabetes management.  Endo was consulted for glycemic control.    PAST MEDICAL & SURGICAL HISTORY:  Diabetes    Foreign body in skin  right foot    Diabetes    No significant past surgical history    No significant past surgical history        FAMILY HISTORY:      Social History:    Outpatient Medications:    MEDICATIONS  (STANDING):  aspirin enteric coated 81 milliGRAM(s) Oral daily  atorvastatin 40 milliGRAM(s) Oral at bedtime  dextrose 40% Gel 15 Gram(s) Oral once  dextrose 5%. 1000 milliLiter(s) (50 mL/Hr) IV Continuous <Continuous>  dextrose 5%. 1000 milliLiter(s) (100 mL/Hr) IV Continuous <Continuous>  dextrose 50% Injectable 25 Gram(s) IV Push once  dextrose 50% Injectable 12.5 Gram(s) IV Push once  dextrose 50% Injectable 25 Gram(s) IV Push once  famotidine    Tablet 20 milliGRAM(s) Oral daily  glucagon  Injectable 1 milliGRAM(s) IntraMuscular once  insulin lispro (ADMELOG) corrective regimen sliding scale   SubCutaneous three times a day before meals  insulin lispro (ADMELOG) corrective regimen sliding scale   SubCutaneous at bedtime  metoprolol tartrate 12.5 milliGRAM(s) Oral two times a day  pantoprazole    Tablet 40 milliGRAM(s) Oral before breakfast  ticagrelor 90 milliGRAM(s) Oral every 12 hours    MEDICATIONS  (PRN):      Allergies    No Known Allergies    Intolerances      Review of Systems:  Constitutional: No fever, no chills  Eyes: No blurry vision  Neuro: No tremors  HEENT: No pain, no neck swelling  Cardiovascular: No chest pain, no palpitations  Respiratory: Has SOB, no cough  GI: No nausea, vomiting, abdominal pain  : No dysuria  Skin: no rash  MSK: Has leg swelling.  Psych: no depression  Endocrine: no polyuria, polydipsia    ALL OTHER SYSTEMS REVIEWED AND NEGATIVE    UNABLE TO OBTAIN    PHYSICAL EXAM:  VITALS: T(C): 36.6 (03-04-21 @ 09:12)  T(F): 97.8 (03-04-21 @ 09:12), Max: 98.2 (03-04-21 @ 05:09)  HR: 101 (03-04-21 @ 09:12) (82 - 101)  BP: 144/75 (03-04-21 @ 09:12) (126/64 - 144/75)  RR:  (16 - 16)  SpO2:  (99% - 100%)  Wt(kg): --  GENERAL: NAD, well-groomed, well-developed  EYES: No proptosis, no lid lag  HEENT:  Atraumatic, Normocephalic  THYROID: Normal size, no palpable nodules  RESPIRATORY: Clear to auscultation bilaterally; No rales, rhonchi, wheezing  CARDIOVASCULAR: Si S2, No murmurs;  GI: Soft, non distended, normal bowel sounds  SKIN: Dry, intact, No rashes or lesions  MUSCULOSKELETAL: Has BL lower extremity edema.  NEURO:  no tremor, sensation decreased in feet BL,    POCT Blood Glucose.: 280 mg/dL (03-04-21 @ 11:16)  POCT Blood Glucose.: 162 mg/dL (03-04-21 @ 07:06)  POCT Blood Glucose.: 196 mg/dL (03-03-21 @ 21:04)  POCT Blood Glucose.: 170 mg/dL (03-03-21 @ 16:33)  POCT Blood Glucose.: 193 mg/dL (03-03-21 @ 11:11)  POCT Blood Glucose.: 165 mg/dL (03-03-21 @ 07:05)  POCT Blood Glucose.: 167 mg/dL (03-02-21 @ 23:11)  POCT Blood Glucose.: 184 mg/dL (03-02-21 @ 21:03)  POCT Blood Glucose.: 148 mg/dL (03-02-21 @ 18:19)                            14.2   8.87  )-----------( 125      ( 03 Mar 2021 06:04 )             43.1       03-03    138  |  99  |  6<L>  ----------------------------<  150<H>  3.6   |  24  |  0.69    EGFR if : 126  EGFR if non : 108    Ca    9.9      03-03    TPro  7.5  /  Alb  4.6  /  TBili  0.4  /  DBili  x   /  AST  13  /  ALT  13  /  AlkPhos  50  03-02      Thyroid Function Tests:          03-03 Chol 194 LDL -- HDL 37<L> Trig 114    Radiology:                    HPI:  56 yo M with PMhx DM presented to ED after being sent by his cardiologist Dr Tamia Khan for EKG changes. Patient has been c/o chest pain for 3 weeks and attributed it to heart burn stating it was relieved by pepcid.  He was recently on Z-pack for fever chills and congestion, He states he was tested for COVID x 3 and all were negative .  He followed with  for his CP  heart burn and said his EKG was abnormal and was sent to the ED.     No fever, chills, No N/V/D or abdominal pain. COVID negative 3/2 .  (02 Mar 2021 18:39)    Patient has history of diabetes, A1C 6.9%, on oral meds at home (Metformin 850mg BID), reports well-controlled blood sugars, no recent hypoglycemic  episodes, no polyuria polydipsia. Patient follows up with PCP for diabetes management.  Endo was consulted for glycemic control.    PAST MEDICAL & SURGICAL HISTORY:  Diabetes    Foreign body in skin  right foot    Diabetes    No significant past surgical history    No significant past surgical history        FAMILY HISTORY:      Social History:    Outpatient Medications:    MEDICATIONS  (STANDING):  aspirin enteric coated 81 milliGRAM(s) Oral daily  atorvastatin 40 milliGRAM(s) Oral at bedtime  dextrose 40% Gel 15 Gram(s) Oral once  dextrose 5%. 1000 milliLiter(s) (50 mL/Hr) IV Continuous <Continuous>  dextrose 5%. 1000 milliLiter(s) (100 mL/Hr) IV Continuous <Continuous>  dextrose 50% Injectable 25 Gram(s) IV Push once  dextrose 50% Injectable 12.5 Gram(s) IV Push once  dextrose 50% Injectable 25 Gram(s) IV Push once  famotidine    Tablet 20 milliGRAM(s) Oral daily  glucagon  Injectable 1 milliGRAM(s) IntraMuscular once  insulin lispro (ADMELOG) corrective regimen sliding scale   SubCutaneous three times a day before meals  insulin lispro (ADMELOG) corrective regimen sliding scale   SubCutaneous at bedtime  metoprolol tartrate 12.5 milliGRAM(s) Oral two times a day  pantoprazole    Tablet 40 milliGRAM(s) Oral before breakfast  ticagrelor 90 milliGRAM(s) Oral every 12 hours    MEDICATIONS  (PRN):      Allergies    No Known Allergies    Intolerances      Review of Systems:  Constitutional: No fever, no chills  Eyes: No blurry vision  Neuro: No tremors  HEENT: No pain, no neck swelling  Cardiovascular: No chest pain, no palpitations  Respiratory: Has SOB, no cough  GI: No nausea, vomiting, abdominal pain  : No dysuria  Skin: no rash  MSK: Has leg swelling.  Psych: no depression  Endocrine: no polyuria, polydipsia    ALL OTHER SYSTEMS REVIEWED AND NEGATIVE    UNABLE TO OBTAIN    PHYSICAL EXAM:  VITALS: T(C): 36.6 (03-04-21 @ 09:12)  T(F): 97.8 (03-04-21 @ 09:12), Max: 98.2 (03-04-21 @ 05:09)  HR: 101 (03-04-21 @ 09:12) (82 - 101)  BP: 144/75 (03-04-21 @ 09:12) (126/64 - 144/75)  RR:  (16 - 16)  SpO2:  (99% - 100%)  Wt(kg): --  GENERAL: NAD, well-groomed, well-developed  EYES: No proptosis, no lid lag  HEENT:  Atraumatic, Normocephalic  THYROID: Normal size, no palpable nodules  RESPIRATORY: Clear to auscultation bilaterally; No rales, rhonchi, wheezing  CARDIOVASCULAR: Si S2, No murmurs;  GI: Soft, non distended, normal bowel sounds  SKIN: Dry, intact, No rashes or lesions  MUSCULOSKELETAL: Has BL lower extremity edema.  NEURO:  no tremor, sensation decreased in feet BL,    POCT Blood Glucose.: 280 mg/dL (03-04-21 @ 11:16)  POCT Blood Glucose.: 162 mg/dL (03-04-21 @ 07:06)  POCT Blood Glucose.: 196 mg/dL (03-03-21 @ 21:04)  POCT Blood Glucose.: 170 mg/dL (03-03-21 @ 16:33)  POCT Blood Glucose.: 193 mg/dL (03-03-21 @ 11:11)  POCT Blood Glucose.: 165 mg/dL (03-03-21 @ 07:05)  POCT Blood Glucose.: 167 mg/dL (03-02-21 @ 23:11)  POCT Blood Glucose.: 184 mg/dL (03-02-21 @ 21:03)  POCT Blood Glucose.: 148 mg/dL (03-02-21 @ 18:19)                            14.2   8.87  )-----------( 125      ( 03 Mar 2021 06:04 )             43.1       03-03    138  |  99  |  6<L>  ----------------------------<  150<H>  3.6   |  24  |  0.69    EGFR if : 126  EGFR if non : 108    Ca    9.9      03-03    TPro  7.5  /  Alb  4.6  /  TBili  0.4  /  DBili  x   /  AST  13  /  ALT  13  /  AlkPhos  50  03-02      Thyroid Function Tests:          03-03 Chol 194 LDL -- HDL 37<L> Trig 114    Radiology:

## 2021-03-05 ENCOUNTER — TRANSCRIPTION ENCOUNTER (OUTPATIENT)
Age: 56
End: 2021-03-05

## 2021-03-05 VITALS
OXYGEN SATURATION: 97 % | HEART RATE: 99 BPM | SYSTOLIC BLOOD PRESSURE: 98 MMHG | RESPIRATION RATE: 18 BRPM | DIASTOLIC BLOOD PRESSURE: 68 MMHG

## 2021-03-05 LAB
ANION GAP SERPL CALC-SCNC: 16 MMOL/L — SIGNIFICANT CHANGE UP (ref 5–17)
BUN SERPL-MCNC: 8 MG/DL — SIGNIFICANT CHANGE UP (ref 7–23)
CALCIUM SERPL-MCNC: 9.7 MG/DL — SIGNIFICANT CHANGE UP (ref 8.4–10.5)
CHLORIDE SERPL-SCNC: 104 MMOL/L — SIGNIFICANT CHANGE UP (ref 96–108)
CO2 SERPL-SCNC: 20 MMOL/L — LOW (ref 22–31)
CREAT SERPL-MCNC: 0.72 MG/DL — SIGNIFICANT CHANGE UP (ref 0.5–1.3)
GLUCOSE BLDC GLUCOMTR-MCNC: 148 MG/DL — HIGH (ref 70–99)
GLUCOSE BLDC GLUCOMTR-MCNC: 158 MG/DL — HIGH (ref 70–99)
GLUCOSE SERPL-MCNC: 210 MG/DL — HIGH (ref 70–99)
HCT VFR BLD CALC: 42.5 % — SIGNIFICANT CHANGE UP (ref 39–50)
HGB BLD-MCNC: 14.2 G/DL — SIGNIFICANT CHANGE UP (ref 13–17)
MCHC RBC-ENTMCNC: 28.6 PG — SIGNIFICANT CHANGE UP (ref 27–34)
MCHC RBC-ENTMCNC: 33.4 GM/DL — SIGNIFICANT CHANGE UP (ref 32–36)
MCV RBC AUTO: 85.5 FL — SIGNIFICANT CHANGE UP (ref 80–100)
NRBC # BLD: 0 /100 WBCS — SIGNIFICANT CHANGE UP (ref 0–0)
PLATELET # BLD AUTO: 121 K/UL — LOW (ref 150–400)
POTASSIUM SERPL-MCNC: 3.8 MMOL/L — SIGNIFICANT CHANGE UP (ref 3.5–5.3)
POTASSIUM SERPL-SCNC: 3.8 MMOL/L — SIGNIFICANT CHANGE UP (ref 3.5–5.3)
RBC # BLD: 4.97 M/UL — SIGNIFICANT CHANGE UP (ref 4.2–5.8)
RBC # FLD: 12.9 % — SIGNIFICANT CHANGE UP (ref 10.3–14.5)
SODIUM SERPL-SCNC: 140 MMOL/L — SIGNIFICANT CHANGE UP (ref 135–145)
WBC # BLD: 10.33 K/UL — SIGNIFICANT CHANGE UP (ref 3.8–10.5)
WBC # FLD AUTO: 10.33 K/UL — SIGNIFICANT CHANGE UP (ref 3.8–10.5)

## 2021-03-05 PROCEDURE — 85730 THROMBOPLASTIN TIME PARTIAL: CPT

## 2021-03-05 PROCEDURE — 86901 BLOOD TYPING SEROLOGIC RH(D): CPT

## 2021-03-05 PROCEDURE — A9505: CPT

## 2021-03-05 PROCEDURE — C1725: CPT

## 2021-03-05 PROCEDURE — C1760: CPT

## 2021-03-05 PROCEDURE — 99285 EMERGENCY DEPT VISIT HI MDM: CPT | Mod: 25

## 2021-03-05 PROCEDURE — 85027 COMPLETE CBC AUTOMATED: CPT

## 2021-03-05 PROCEDURE — U0003: CPT

## 2021-03-05 PROCEDURE — 80048 BASIC METABOLIC PNL TOTAL CA: CPT

## 2021-03-05 PROCEDURE — 93458 L HRT ARTERY/VENTRICLE ANGIO: CPT

## 2021-03-05 PROCEDURE — 93005 ELECTROCARDIOGRAM TRACING: CPT

## 2021-03-05 PROCEDURE — 71045 X-RAY EXAM CHEST 1 VIEW: CPT

## 2021-03-05 PROCEDURE — 99152 MOD SED SAME PHYS/QHP 5/>YRS: CPT

## 2021-03-05 PROCEDURE — 82553 CREATINE MB FRACTION: CPT

## 2021-03-05 PROCEDURE — C1887: CPT

## 2021-03-05 PROCEDURE — 82962 GLUCOSE BLOOD TEST: CPT

## 2021-03-05 PROCEDURE — 85025 COMPLETE CBC W/AUTO DIFF WBC: CPT

## 2021-03-05 PROCEDURE — C1874: CPT

## 2021-03-05 PROCEDURE — C1894: CPT

## 2021-03-05 PROCEDURE — 92978 ENDOLUMINL IVUS OCT C 1ST: CPT | Mod: RC

## 2021-03-05 PROCEDURE — 84484 ASSAY OF TROPONIN QUANT: CPT

## 2021-03-05 PROCEDURE — 82550 ASSAY OF CK (CPK): CPT

## 2021-03-05 PROCEDURE — 86900 BLOOD TYPING SEROLOGIC ABO: CPT

## 2021-03-05 PROCEDURE — C8929: CPT

## 2021-03-05 PROCEDURE — 80053 COMPREHEN METABOLIC PANEL: CPT

## 2021-03-05 PROCEDURE — U0005: CPT

## 2021-03-05 PROCEDURE — 78452 HT MUSCLE IMAGE SPECT MULT: CPT

## 2021-03-05 PROCEDURE — 83036 HEMOGLOBIN GLYCOSYLATED A1C: CPT

## 2021-03-05 PROCEDURE — 99153 MOD SED SAME PHYS/QHP EA: CPT

## 2021-03-05 PROCEDURE — 85610 PROTHROMBIN TIME: CPT

## 2021-03-05 PROCEDURE — C1753: CPT

## 2021-03-05 PROCEDURE — 80061 LIPID PANEL: CPT

## 2021-03-05 PROCEDURE — C9600: CPT | Mod: RC

## 2021-03-05 PROCEDURE — 86850 RBC ANTIBODY SCREEN: CPT

## 2021-03-05 PROCEDURE — C1769: CPT

## 2021-03-05 RX ORDER — METOPROLOL TARTRATE 50 MG
1 TABLET ORAL
Qty: 60 | Refills: 0
Start: 2021-03-05 | End: 2021-04-03

## 2021-03-05 RX ORDER — ATORVASTATIN CALCIUM 80 MG/1
1 TABLET, FILM COATED ORAL
Qty: 30 | Refills: 0
Start: 2021-03-05 | End: 2021-04-03

## 2021-03-05 RX ORDER — CLOPIDOGREL BISULFATE 75 MG/1
600 TABLET, FILM COATED ORAL ONCE
Refills: 0 | Status: COMPLETED | OUTPATIENT
Start: 2021-03-05 | End: 2021-03-05

## 2021-03-05 RX ORDER — ASPIRIN/CALCIUM CARB/MAGNESIUM 324 MG
1 TABLET ORAL
Qty: 90 | Refills: 3
Start: 2021-03-05 | End: 2022-02-27

## 2021-03-05 RX ORDER — METFORMIN HYDROCHLORIDE 850 MG/1
1 TABLET ORAL
Qty: 0 | Refills: 0 | DISCHARGE

## 2021-03-05 RX ORDER — CLOPIDOGREL BISULFATE 75 MG/1
75 TABLET, FILM COATED ORAL DAILY
Refills: 0 | Status: DISCONTINUED | OUTPATIENT
Start: 2021-03-06 | End: 2021-03-05

## 2021-03-05 RX ORDER — PANTOPRAZOLE SODIUM 20 MG/1
1 TABLET, DELAYED RELEASE ORAL
Qty: 0 | Refills: 0 | DISCHARGE

## 2021-03-05 RX ORDER — METOPROLOL TARTRATE 50 MG
0.5 TABLET ORAL
Qty: 30 | Refills: 0
Start: 2021-03-05 | End: 2021-04-03

## 2021-03-05 RX ORDER — CLOPIDOGREL BISULFATE 75 MG/1
1 TABLET, FILM COATED ORAL
Qty: 90 | Refills: 3
Start: 2021-03-05 | End: 2022-02-27

## 2021-03-05 RX ORDER — TICAGRELOR 90 MG/1
1 TABLET ORAL
Qty: 60 | Refills: 1
Start: 2021-03-05 | End: 2021-05-03

## 2021-03-05 RX ORDER — SITAGLIPTIN AND METFORMIN HYDROCHLORIDE 500; 50 MG/1; MG/1
1 TABLET, FILM COATED ORAL
Qty: 60 | Refills: 0
Start: 2021-03-05 | End: 2021-04-03

## 2021-03-05 RX ORDER — METFORMIN HYDROCHLORIDE 850 MG/1
1 TABLET ORAL
Qty: 60 | Refills: 1
Start: 2021-03-05 | End: 2021-05-03

## 2021-03-05 RX ORDER — METFORMIN HYDROCHLORIDE 850 MG/1
0 TABLET ORAL
Qty: 0 | Refills: 0 | DISCHARGE

## 2021-03-05 RX ORDER — POTASSIUM CHLORIDE 20 MEQ
40 PACKET (EA) ORAL ONCE
Refills: 0 | Status: COMPLETED | OUTPATIENT
Start: 2021-03-05 | End: 2021-03-05

## 2021-03-05 RX ADMIN — Medication 12.5 MILLIGRAM(S): at 06:14

## 2021-03-05 RX ADMIN — FAMOTIDINE 20 MILLIGRAM(S): 10 INJECTION INTRAVENOUS at 12:02

## 2021-03-05 RX ADMIN — CLOPIDOGREL BISULFATE 600 MILLIGRAM(S): 75 TABLET, FILM COATED ORAL at 09:13

## 2021-03-05 RX ADMIN — Medication 81 MILLIGRAM(S): at 06:14

## 2021-03-05 RX ADMIN — Medication 40 MILLIEQUIVALENT(S): at 06:24

## 2021-03-05 RX ADMIN — Medication 1: at 07:34

## 2021-03-05 RX ADMIN — PANTOPRAZOLE SODIUM 40 MILLIGRAM(S): 20 TABLET, DELAYED RELEASE ORAL at 06:24

## 2021-03-05 NOTE — DISCHARGE NOTE PROVIDER - PROVIDER TOKENS
PROVIDER:[TOKEN:[[id number]],FOLLOWUP:[2 weeks],ESTABLISHEDPATIENT:[T]] PROVIDER:[TOKEN:[[id number]],FOLLOWUP:[2 weeks],ESTABLISHEDPATIENT:[T]],PROVIDER:[TOKEN:[[id number]],FOLLOWUP:[1 month],ESTABLISHEDPATIENT:[T]]

## 2021-03-05 NOTE — PROGRESS NOTE ADULT - ASSESSMENT
56 yo male h/o DM, here with abnl ekg and chest pain.     chest pain  s/p cath   results noted  f/u viability study  f/u echo  asa/brilinta  BB  statin    dm  check a1c  iss  monitor fs      dvt ppx      Advanced care planning was discussed with patient and family.  Advanced care planning forms were reviewed and discussed as appropriate.  Differential diagnosis and plan of care discussed with patient after the evaluation.   Pain assessed and judicious use of narcotics when appropriate was discussed.  Importance of Fall prevention discussed.  Counseling on Smoking and Alcohol cessation was offered when appropriate.  Counseling on Diet, exercise, and medication compliance was done.   Approx 30 minutes spent.55
56 yo male h/o DM, here with abnl ekg and chest pain.     chest pain  s/p cath   results noted  s/p viability study  pending pci today  echo noted  asa/brilinta  BB  statin    dm  poorly controlled   a1c 6.9  iss  monitor fs  endo consulted      dvt ppx      Advanced care planning was discussed with patient and family.  Advanced care planning forms were reviewed and discussed as appropriate.  Differential diagnosis and plan of care discussed with patient after the evaluation.   Pain assessed and judicious use of narcotics when appropriate was discussed.  Importance of Fall prevention discussed.  Counseling on Smoking and Alcohol cessation was offered when appropriate.  Counseling on Diet, exercise, and medication compliance was done.   Approx 30 minutes spent.55
HPI:  56 yo M with PMhx DM presented to ED after being sent by his cardiologist Dr Tamia Khan for EKG changes. Patient has been c/o chest pain for 3 weeks and attributed it to heart burn stating it was relieved by pepcid.  He was recently on Z-pack for fever chills and congestion, He states he was tested for COVID x 3 and all were negative .  He followed with  for his CP  heart burn and said his EKG was abnormal and was sent to the ED. Pt s/p LHC on 3/2/21 with OM1  (small vessel) and pRCA .  Viability done with LVEF 40% and reversible ischemia in the RCA territory.  Pt now s/p PCI to pRCA with VLAD x 1 via RRA and RFA with ecchymotic area and swelling compressed for 10 minutes with resolve of hematoma.  Pt with elevated blood sugars seen by Endocrine, Dr. Jas Khan.  Was started on Brilinta and ASA.    CAD s/p PCI  Brilinta not affordable to pt as per [facility] $600 to 700/month  D/C Brilinta and load with Plavix 600mg x 1 then continue Plavix 75mg daily (d/w Dr. Peter)  Continue ASA 81mg   Continue Atorvastatin 40mg  Monitor radial and groin sites activity and restrictions reviewed with pt  Followup with Dr. Tamia Khan, Cardiologist in 2 weeks    Diabetes  Continue with Meal coverage and SSC as per Endo  FS better controlled  Need home regimen for discharge today    GERD  Cont Pepcid will d/c Protonix    D/C Planning for discharge today as d/w Dr. Sandoval after recommendations for home regimen for his diabetes.   
  Assessment  DMT2: 55y Male with DM T2 with hyperglycemia, A1C 6.9%, was on oral meds/Metformin at home, admitted with chest pain, on low dose basal bolus insulin,  blood sugars improving, no hypoglycemic episode s/p cath.  CAD: S/P PCI with stents, on medications, no chest pain, stable, monitored.  HTN: Controlled,  on antihypertensive medications.  HLD: On statin    Sixto Khan MD  Cell: [phone]  Office: [phone]

## 2021-03-05 NOTE — CHART NOTE - NSCHARTNOTEFT_GEN_A_CORE
Dr. Sixto Khan, Endo came to see pt.  Recs noted Janumet 50/1000 BID sent to his pharmacy which is not covered.  D/W Dr. Sandoval who attempted to reach  and was unable.  Changed med to Metformin 1000mg BID and will f/u with  in 4 weeks.

## 2021-03-05 NOTE — DISCHARGE NOTE PROVIDER - CARE PROVIDER_API CALL
Tamia Hilliard  CARDIOVASCULAR DISEASE  [address], [address]  [place] [address]  Phone: [phone]  Fax: [phone]  Established Patient  Follow Up Time: 2 weeks   Tamia Hilliard  CARDIOVASCULAR DISEASE  [address] [address]  [place] [address]  Phone: [phone]  Fax: [phone]  Established Patient  Follow Up Time: 2 weeks    Sixto Khan)  EndocrinologyMetabDiabetes; Internal Medicine  [address] [place] [address]  Phone: [phone]  Fax: [phone]  Established Patient  Follow Up Time: 1 month

## 2021-03-05 NOTE — PROGRESS NOTE ADULT - SUBJECTIVE AND OBJECTIVE BOX
Follow Up:  s/p PCI of pRCA  on 3/4/21 via RRA    Subjective/Observations: Sitting in bed resting comfortably with no reports of cp, sob or palpitations.  Tele reviewed.  Right wrist/forearm with hematoma noted.  NAD      REVIEW OF SYSTEMS: All other review of systems is negative unless indicated above    PAST MEDICAL & SURGICAL HISTORY:  Diabetes    Foreign body in skin  right foot    Diabetes    No significant past surgical history    No significant past surgical history        MEDICATIONS  (STANDING):  aspirin enteric coated 81 milliGRAM(s) Oral daily  atorvastatin 40 milliGRAM(s) Oral at bedtime  dextrose 40% Gel 15 Gram(s) Oral once  dextrose 5%. 1000 milliLiter(s) (50 mL/Hr) IV Continuous <Continuous>  dextrose 5%. 1000 milliLiter(s) (100 mL/Hr) IV Continuous <Continuous>  dextrose 50% Injectable 25 Gram(s) IV Push once  dextrose 50% Injectable 12.5 Gram(s) IV Push once  dextrose 50% Injectable 25 Gram(s) IV Push once  famotidine    Tablet 20 milliGRAM(s) Oral daily  glucagon  Injectable 1 milliGRAM(s) IntraMuscular once  insulin glargine Injectable (LANTUS) 6 Unit(s) SubCutaneous at bedtime  insulin lispro (ADMELOG) corrective regimen sliding scale   SubCutaneous three times a day before meals  insulin lispro (ADMELOG) corrective regimen sliding scale   SubCutaneous at bedtime  insulin lispro Injectable (ADMELOG) 4 Unit(s) SubCutaneous three times a day before meals  metoprolol tartrate 12.5 milliGRAM(s) Oral two times a day  pantoprazole    Tablet 40 milliGRAM(s) Oral before breakfast    MEDICATIONS  (PRN):      Allergies    No Known Allergies    Intolerances            Vital Signs Last 24 Hrs  T(C): 36.8 (05 Mar 2021 09:11), Max: 36.8 (04 Mar 2021 16:40)  T(F): 98.3 (05 Mar 2021 09:11), Max: 98.3 (04 Mar 2021 16:40)  HR: 88 (05 Mar 2021 09:11) (84 - 99)  BP: 103/64 (05 Mar 2021 09:11) (100/69 - 135/91)  BP(mean): --  RR: 18 (05 Mar 2021 09:11) (16 - 18)  SpO2: 98% (05 Mar 2021 09:11) (97% - 99%)    I&O's Summary    04 Mar 2021 07:01  -  05 Mar 2021 07:00  --------------------------------------------------------  IN: 420 mL / OUT: 0 mL / NET: 420 mL    05 Mar 2021 07:01  -  05 Mar 2021 10:34  --------------------------------------------------------  IN: 240 mL / OUT: 0 mL / NET: 240 mL          PHYSICAL EXAM:  TELE:   Constitutional: NAD, awake and alert, well-developed  HEENT: Moist Mucous Membranes, Anicteric  Pulmonary: Non-labored, breath sounds are clear bilaterally, No wheezing, rales or rhonchi  Cardiovascular: Regular, S1 and S2, No murmurs, rubs, gallops or clicks  Gastrointestinal: Bowel Sounds present, soft, nontender.   Lymph: No peripheral edema. No lymphadenopathy.  Skin: No visible rashes or ulcers.  Right forearm/wrist with soft, swelling and ecchymosis noted  Psych:  Mood & affect appropriate    LABS: All Labs Reviewed:                        14.2   10.33 )-----------( 121      ( 05 Mar 2021 01:48 )             42.5                         14.2   8.87  )-----------( 125      ( 03 Mar 2021 06:04 )             43.1                         14.8   8.51  )-----------( 144      ( 02 Mar 2021 16:12 )             44.6     05 Mar 2021 01:48    140    |  104    |  8      ----------------------------<  210    3.8     |  20     |  0.72   03 Mar 2021 06:04    138    |  99     |  6      ----------------------------<  150    3.6     |  24     |  0.69   02 Mar 2021 16:12    139    |  100    |  7      ----------------------------<  151    3.5     |  26     |  0.86     Ca    9.7        05 Mar 2021 01:48  Ca    9.9        03 Mar 2021 06:04  Ca    10.3       02 Mar 2021 16:12    TPro  7.5    /  Alb  4.6    /  TBili  0.4    /  DBili  x      /  AST  13     /  ALT  13     /  AlkPhos  50     02 Mar 2021 16:12                     
MINH CORNELIUS  55y Male  MRN:[id number]    Patient is a 55y old  Male who presents with a chief complaint of chest pain   HPI:  54 yo M with PMhx DM presented to ED after being sent by his cardiologist Dr Tamia Khan for EKG changes. Patient has been c/o chest pain for 3 weeks and attributed it to heart burn stating it was relieved by pepcid.  He was recently on Z-pack for fever chills and congestion, He states he was tested for COVID x 3 and all were negative .  He followed with  for his CP  heart burn and said his EKG was abnormal and was sent to the ED.     No fever, chills, No N/V/D or abdominal pain. COVID negative 3/2 .  (02 Mar 2021 18:39)      Patient seen and evaluated at bedside. No acute events overnight except as noted.    Interval HPI:  no acute events o/n     PAST MEDICAL & SURGICAL HISTORY:  Diabetes    No significant past surgical history    SOC:  non smoker    fam hx:  non cont     REVIEW OF SYSTEMS:  Constitutional: No fever, chills, fatigue or weight loss.  Skin: No rash.  Eyes: No recent vision problems or eye pain.  ENT: No congestion, ear pain, or sore throat.  Endocrine: No thyroid problems.  Cardiovascular: as per hpi   Respiratory: No cough, shortness of breath, congestion, or wheezing.  Gastrointestinal: No abdominal pain, nausea, vomiting, or diarrhea.  Genitourinary: No dysuria.  Musculoskeletal: No joint swelling.  Neurologic: No headache.    VITALS:   Vital Signs Last 24 Hrs  T(C): 36.6 (04 Mar 2021 09:12), Max: 36.8 (04 Mar 2021 05:09)  T(F): 97.8 (04 Mar 2021 09:12), Max: 98.2 (04 Mar 2021 05:09)  HR: 101 (04 Mar 2021 09:12) (82 - 101)  BP: 144/75 (04 Mar 2021 09:12) (126/64 - 144/75)  BP(mean): --  RR: 16 (04 Mar 2021 09:12) (16 - 16)  SpO2: 99% (04 Mar 2021 09:12) (99% - 100%)      PHYSICAL EXAM:  GENERAL: NAD, well-developed  HEAD:  Atraumatic, Normocephalic  EYES: EOMI, PERRLA, conjunctiva and sclera clear  NECK: Supple, No JVD  CHEST/LUNG: Clear to auscultation bilaterally; No wheeze  HEART: S1, S2; No murmurs, rubs, or gallops  ABDOMEN: Soft, Nontender, Nondistended; Bowel sounds present  EXTREMITIES:  2+ Peripheral Pulses, No clubbing, cyanosis, or edema  PSYCH: Normal affect  NEUROLOGY: AAOX3; non-focal  SKIN: No rashes or lesions    Consultant(s) Notes Reviewed:  [x ] YES  [ ] NO  Care Discussed with Consultants/Other Providers [ x] YES  [ ] NO    MEDS:   MEDICATIONS  (STANDING):  aspirin enteric coated 81 milliGRAM(s) Oral daily  atorvastatin 40 milliGRAM(s) Oral at bedtime  dextrose 40% Gel 15 Gram(s) Oral once  dextrose 5%. 1000 milliLiter(s) (50 mL/Hr) IV Continuous <Continuous>  dextrose 5%. 1000 milliLiter(s) (100 mL/Hr) IV Continuous <Continuous>  dextrose 50% Injectable 25 Gram(s) IV Push once  dextrose 50% Injectable 12.5 Gram(s) IV Push once  dextrose 50% Injectable 25 Gram(s) IV Push once  famotidine    Tablet 20 milliGRAM(s) Oral daily  glucagon  Injectable 1 milliGRAM(s) IntraMuscular once  insulin lispro (ADMELOG) corrective regimen sliding scale   SubCutaneous three times a day before meals  insulin lispro (ADMELOG) corrective regimen sliding scale   SubCutaneous at bedtime  metoprolol tartrate 12.5 milliGRAM(s) Oral two times a day  pantoprazole    Tablet 40 milliGRAM(s) Oral before breakfast  ticagrelor 90 milliGRAM(s) Oral every 12 hours    MEDICATIONS  (PRN):        ALLERGIES:  No Known Allergies      LABS:                                    14.2   8.87  )-----------( 125      ( 03 Mar 2021 06:04 )             43.1   03-03    138  |  99  |  6<L>  ----------------------------<  150<H>  3.6   |  24  |  0.69    Ca    9.9      03 Mar 2021 06:04    TPro  7.5  /  Alb  4.6  /  TBili  0.4  /  DBili  x   /  AST  13  /  ALT  13  /  AlkPhos  50  03-02      < from: TTE with Doppler (w/Cont) (03.03.21 @ 08:21) >  ---------  Conclusions:  1. Normal left ventricular internal dimensions and wall  thicknesses.  2. Normal left ventricular systolic function. No obvious  segmental wall motion abnormalities (however, image  resolution limited by tachycardia). Endocardial  visualization enhanced with intravenous injection of  Ultrasonic Enhancing Agent (Definity).  3. Normal diastolic function.  4. Grossly normal right ventricular size and systolic  function.  *** No previous Echo exam.  ------------------    < end of copied text >             < from: Cardiac Cath Lab - Adult (03.02.21 @ 17:26) >  VENTRICLES: Analysis of regional contractile function demonstrated moderate  diaphragmatic hypokinesis and severe posterobasal hypokinesis. Global left  ventricular function was moderately depressed. EF estimated was 40 %.  CORONARY VESSELS: The coronary circulation is right dominant.  LM:   --  LM: Angiography showed minor luminal irregularities with no flow  limiting lesions.  LAD:   --  Proximal LAD: There was a 50 % stenosis.  --  Mid LAD: There was a 30 % stenosis.  --  D1: Angiography showed minor luminal irregularities with no flow  limiting lesions.  CX:   --  OM1: There was a 100 % stenosis. Chronic total occlusion.  RCA:   --  Proximal RCA: There was a 100 % stenosis.  COMPLICATIONS: There were no complications.  DIAGNOSTIC IMPRESSIONS: Occluded RCA (likely the vessel that caused his  symptoms approximately 3 weeks ago). Small OM1 . Mild-Mod LAD disease.  EF 40%  DIAGNOSTIC RECOMMENDATIONS: Admit. Follow Ce. Echocardiogram. Nuclear  viability to assess for possible PCI of the RCA. Start antiplatelet Rx  (ideally Brilinta and aspirin). Aggressive lipid therapy. Beta blocker and  ACE (ARB) as BP tolerates.  Prepared and signed by  Ronna Peter M.D.    < end of copied text >  
    Chief complaint  Patient is a 55y old  Male who presents with a chief complaint of chest pain (05 Mar 2021 03:02)   Review of systems  Patient in bed, appears comfortable.    Labs and Fingersticks  CAPILLARY BLOOD GLUCOSE      POCT Blood Glucose.: 148 mg/dL (05 Mar 2021 11:06)  POCT Blood Glucose.: 158 mg/dL (05 Mar 2021 07:10)  POCT Blood Glucose.: 191 mg/dL (04 Mar 2021 21:05)  POCT Blood Glucose.: 141 mg/dL (04 Mar 2021 16:50)      Anion Gap, Serum: 16 (03-05 @ 01:48)      Calcium, Total Serum: 9.7 (03-05 @ 01:48)          03-05    140  |  104  |  8   ----------------------------<  210<H>  3.8   |  20<L>  |  0.72    Ca    9.7      05 Mar 2021 01:48                          14.2   10.33 )-----------( 121      ( 05 Mar 2021 01:48 )             42.5     Medications  MEDICATIONS  (STANDING):  aspirin enteric coated 81 milliGRAM(s) Oral daily  atorvastatin 40 milliGRAM(s) Oral at bedtime  dextrose 40% Gel 15 Gram(s) Oral once  dextrose 5%. 1000 milliLiter(s) (50 mL/Hr) IV Continuous <Continuous>  dextrose 5%. 1000 milliLiter(s) (100 mL/Hr) IV Continuous <Continuous>  dextrose 50% Injectable 25 Gram(s) IV Push once  dextrose 50% Injectable 12.5 Gram(s) IV Push once  dextrose 50% Injectable 25 Gram(s) IV Push once  famotidine    Tablet 20 milliGRAM(s) Oral daily  glucagon  Injectable 1 milliGRAM(s) IntraMuscular once  insulin glargine Injectable (LANTUS) 6 Unit(s) SubCutaneous at bedtime  insulin lispro (ADMELOG) corrective regimen sliding scale   SubCutaneous three times a day before meals  insulin lispro (ADMELOG) corrective regimen sliding scale   SubCutaneous at bedtime  insulin lispro Injectable (ADMELOG) 4 Unit(s) SubCutaneous three times a day before meals  metoprolol tartrate 12.5 milliGRAM(s) Oral two times a day      Physical Exam  General: Patient comfortable in bed  Vital Signs Last 12 Hrs  T(F): 98.3 (03-05-21 @ 09:11), Max: 98.3 (03-05-21 @ 07:59)  HR: 88 (03-05-21 @ 09:11) (88 - 95)  BP: 103/64 (03-05-21 @ 09:11) (100/69 - 123/80)  BP(mean): --  RR: 18 (03-05-21 @ 09:11) (16 - 18)  SpO2: 98% (03-05-21 @ 09:11) (98% - 98%)  Neck: No palpable thyroid nodules.  CVS: S1S2, No murmurs  Respiratory: No wheezing, no crepitations  GI: Abdomen soft, bowel sounds positive  Musculoskeletal:  edema lower extremities.     Diagnostics          
MINH CORNELIUS  55y Male  MRN:[id number]    Patient is a 55y old  Male who presents with a chief complaint of chest pain   HPI:  54 yo M with PMhx DM presented to ED after being sent by his cardiologist Dr Tamia Khan for EKG changes. Patient has been c/o chest pain for 3 weeks and attributed it to heart burn stating it was relieved by pepcid.  He was recently on Z-pack for fever chills and congestion, He states he was tested for COVID x 3 and all were negative .  He followed with  for his CP  heart burn and said his EKG was abnormal and was sent to the ED.     No fever, chills, No N/V/D or abdominal pain. COVID negative 3/2 .  (02 Mar 2021 18:39)      Patient seen and evaluated at bedside. No acute events overnight except as noted.    Interval HPI:  s/p cath     PAST MEDICAL & SURGICAL HISTORY:  Diabetes    No significant past surgical history    SOC:  non smoker    fam hx:  non cont     REVIEW OF SYSTEMS:  Constitutional: No fever, chills, fatigue or weight loss.  Skin: No rash.  Eyes: No recent vision problems or eye pain.  ENT: No congestion, ear pain, or sore throat.  Endocrine: No thyroid problems.  Cardiovascular: as per hpi   Respiratory: No cough, shortness of breath, congestion, or wheezing.  Gastrointestinal: No abdominal pain, nausea, vomiting, or diarrhea.  Genitourinary: No dysuria.  Musculoskeletal: No joint swelling.  Neurologic: No headache.    VITALS:  Vital Signs Last 24 Hrs  T(C): 36.8 (03 Mar 2021 09:00), Max: 36.8 (03 Mar 2021 05:50)  T(F): 98.2 (03 Mar 2021 09:00), Max: 98.3 (03 Mar 2021 05:50)  HR: 100 (03 Mar 2021 11:02) (94 - 117)  BP: 128/69 (03 Mar 2021 11:02) (128/69 - 160/91)  BP(mean): 1 (02 Mar 2021 18:35) (1 - 1)  RR: 17 (03 Mar 2021 11:02) (10 - 24)  SpO2: 98% (03 Mar 2021 11:02) (98% - 100%)  CAPILLARY BLOOD GLUCOSE      POCT Blood Glucose.: 193 mg/dL (03 Mar 2021 11:11)  POCT Blood Glucose.: 165 mg/dL (03 Mar 2021 07:05)  POCT Blood Glucose.: 167 mg/dL (02 Mar 2021 23:11)  POCT Blood Glucose.: 184 mg/dL (02 Mar 2021 21:03)  POCT Blood Glucose.: 148 mg/dL (02 Mar 2021 18:19)    I&O's Summary    03 Mar 2021 07:01  -  03 Mar 2021 12:12  --------------------------------------------------------  IN: 480 mL / OUT: 0 mL / NET: 480 mL        PHYSICAL EXAM:  GENERAL: NAD, well-developed  HEAD:  Atraumatic, Normocephalic  EYES: EOMI, PERRLA, conjunctiva and sclera clear  NECK: Supple, No JVD  CHEST/LUNG: Clear to auscultation bilaterally; No wheeze  HEART: S1, S2; No murmurs, rubs, or gallops  ABDOMEN: Soft, Nontender, Nondistended; Bowel sounds present  EXTREMITIES:  2+ Peripheral Pulses, No clubbing, cyanosis, or edema  PSYCH: Normal affect  NEUROLOGY: AAOX3; non-focal  SKIN: No rashes or lesions    Consultant(s) Notes Reviewed:  [x ] YES  [ ] NO  Care Discussed with Consultants/Other Providers [ x] YES  [ ] NO    MEDS:  MEDICATIONS  (STANDING):  aspirin enteric coated 81 milliGRAM(s) Oral daily  atorvastatin 40 milliGRAM(s) Oral at bedtime  dextrose 40% Gel 15 Gram(s) Oral once  dextrose 5%. 1000 milliLiter(s) (50 mL/Hr) IV Continuous <Continuous>  dextrose 5%. 1000 milliLiter(s) (100 mL/Hr) IV Continuous <Continuous>  dextrose 50% Injectable 25 Gram(s) IV Push once  dextrose 50% Injectable 12.5 Gram(s) IV Push once  dextrose 50% Injectable 25 Gram(s) IV Push once  famotidine    Tablet 20 milliGRAM(s) Oral daily  glucagon  Injectable 1 milliGRAM(s) IntraMuscular once  insulin lispro (ADMELOG) corrective regimen sliding scale   SubCutaneous three times a day before meals  insulin lispro (ADMELOG) corrective regimen sliding scale   SubCutaneous at bedtime  metoprolol tartrate 12.5 milliGRAM(s) Oral two times a day  pantoprazole    Tablet 40 milliGRAM(s) Oral before breakfast  ticagrelor 90 milliGRAM(s) Oral every 12 hours    MEDICATIONS  (PRN):    ALLERGIES:  No Known Allergies      LABS:                        14.2   8.87  )-----------( 125      ( 03 Mar 2021 06:04 )             43.1     03-03    138  |  99  |  6<L>  ----------------------------<  150<H>  3.6   |  24  |  0.69    Ca    9.9      03 Mar 2021 06:04    TPro  7.5  /  Alb  4.6  /  TBili  0.4  /  DBili  x   /  AST  13  /  ALT  13  /  AlkPhos  50  03-02    PT/INR - ( 02 Mar 2021 16:12 )   PT: 12.3 sec;   INR: 1.03 ratio         PTT - ( 02 Mar 2021 16:12 )  PTT:30.6 sec  CARDIAC MARKERS ( 02 Mar 2021 16:12 )  x     / x     / 57 U/L / x     / 2.4 ng/mL      LIVER FUNCTIONS - ( 02 Mar 2021 16:12 )  Alb: 4.6 g/dL / Pro: 7.5 g/dL / ALK PHOS: 50 U/L / ALT: 13 U/L / AST: 13 U/L / GGT: x            < from: Cardiac Cath Lab - Adult (03.02.21 @ 17:26) >  VENTRICLES: Analysis of regional contractile function demonstrated moderate  diaphragmatic hypokinesis and severe posterobasal hypokinesis. Global left  ventricular function was moderately depressed. EF estimated was 40 %.  CORONARY VESSELS: The coronary circulation is right dominant.  LM:   --  LM: Angiography showed minor luminal irregularities with no flow  limiting lesions.  LAD:   --  Proximal LAD: There was a 50 % stenosis.  --  Mid LAD: There was a 30 % stenosis.  --  D1: Angiography showed minor luminal irregularities with no flow  limiting lesions.  CX:   --  OM1: There was a 100 % stenosis. Chronic total occlusion.  RCA:   --  Proximal RCA: There was a 100 % stenosis.  COMPLICATIONS: There were no complications.  DIAGNOSTIC IMPRESSIONS: Occluded RCA (likely the vessel that caused his  symptoms approximately 3 weeks ago). Small OM1 . Mild-Mod LAD disease.  EF 40%  DIAGNOSTIC RECOMMENDATIONS: Admit. Follow Ce. Echocardiogram. Nuclear  viability to assess for possible PCI of the RCA. Start antiplatelet Rx  (ideally Brilinta and aspirin). Aggressive lipid therapy. Beta blocker and  ACE (ARB) as BP tolerates.  Prepared and signed by  Ronna Peter M.D.    < end of copied text >

## 2021-03-05 NOTE — PROGRESS NOTE ADULT - PROBLEM SELECTOR PLAN 1
Will continue current insulin regimen for now. Will continue monitoring  blood sugars, will Follow up.  Will DC home on Janumet 50/1000mg PO BID, will starts after 2 days since had iv contrast procedure.  Patient counseled for compliance with consistent low carb diet.

## 2021-03-05 NOTE — DISCHARGE NOTE PROVIDER - HOSPITAL COURSE
54 yo M with PMhx DM presented to ED after being sent by his cardiologist Dr Tamia Khan for EKG changes. Patient has been c/o chest pain for 3 weeks and attributed it to heart burn stating it was relieved by pepcid.  He was recently on Z-pack for fever chills and congestion, He states he was tested for COVID x 3 and all were negative .  He followed with  for his CP  heart burn and said his EKG was abnormal and was sent to the ED.   s/p C OM1 , PRCA 100%, viability study, RCA PCI x1 via right groin access, right groin without hematoma, positive pulses. Patient without complaint, hemodynamically stable. Pending discharge later today 54 yo M with PMhx DM presented to ED after being sent by his cardiologist Dr Tamia Khan for EKG changes. Patient has been c/o chest pain for 3 weeks and attributed it to heart burn stating it was relieved by pepcid.  He was recently on Z-pack for fever chills and congestion, He states he was tested for COVID x 3 and all were negative .  He followed with  for his CP  heart burn and said his EKG was abnormal and was sent to the ED.   s/p C OM1 , PRCA 100%, viability study, RCA PCI x1 via right groin access, right groin without hematoma, positive pulses. Patient without complaint, hemodynamically stable. Pending discharge later today.  Seen by Endocrine recommendations noted.  Pt stable for discharge from cardiology with f/u with Dr. Tamia Khan in 2 weeks and 4 weeks with Randall Dalal.  Discussed with Dr. Sandoval and Dr. Peter cleared for d/c home.

## 2021-03-05 NOTE — DISCHARGE NOTE PROVIDER - NSDCCPTREATMENT_GEN_ALL_CORE_FT
PRINCIPAL PROCEDURE  Procedure: Left heart cardiac cath  Findings and Treatment: PROCEDURE:  --  Left heart catheterization with ventriculography.  --  Left coronary angiography.  --  Right coronary angiography.  CORONARY VESSELS: The coronary circulation is right dominant.  LM:   --  LM: Angiography showed minor luminal irregularities with no flow  limiting lesions.  LAD:   --  Proximal LAD: There was a 50 % stenosis.  --  Mid LAD: There was a 30 % stenosis.  --  D1: Angiography showed minor luminal irregularities with no flow  limiting lesions.  CX:   --  OM1: There was a 100 % stenosis. Chronic total occlusion.  RCA:   --  Proximal RCA: There was a 100 % stenosis.  COMPLICATIONS: There were no complications.  DIAGNOSTIC IMPRESSIONS: Occluded RCA (likely the vessel that caused his symptoms approximately 3 weeks ago). Small OM1 . Mild-Mod LAD disease.  EF 40%

## 2021-03-05 NOTE — DISCHARGE NOTE PROVIDER - NSRESEARCHGRANT_OVERRIDEREC_GEN_A_CORE
IMPROVE-DD Application Not Available This is a surgical and/or non-medical patient./IMPROVE-DD Application Not Available

## 2021-03-05 NOTE — DISCHARGE NOTE PROVIDER - NSDCCPCAREPLAN_GEN_ALL_CORE_FT
PRINCIPAL DISCHARGE DIAGNOSIS  Diagnosis: CAD (coronary artery disease)  Assessment and Plan of Treatment: Low salt, low fat diet.   Weight management.   Take medications as prescribed.    No smoking.  Follow up appointments with your doctor(s)  as instruced.   Continue your medications. Do not stop Aspirin or Brilinta unless instructed by your cardiologist.  No heavy lifting, strenuous activity, bending, straining or unnecessary stair climbing for 2 weeks. No sex for 1 week.  No driving for 2 days. You may shower 24 hours following procedure but avoid baths and swimming for 1 week. Check groin site for bleeding and/or swelling daily following procedure. Call your doctor/cardiologist immediately for bleeding or swelling or if you have increased/persistent pain or drainage at the site. Follow up with your cardiologist in 1- 2 weeks. You may call [facility] at [phone] or [phone] after office hours and weekends with any questions or concerns following your procedure.      SECONDARY DISCHARGE DIAGNOSES  Diagnosis: Abnormal ECG  Assessment and Plan of Treatment:      PRINCIPAL DISCHARGE DIAGNOSIS  Diagnosis: CAD (coronary artery disease)  Assessment and Plan of Treatment: Low salt, low fat diet.   Weight management.   Take medications as prescribed.    No smoking.  Follow up appointments with your doctor(s)  as instruced.   Continue your medications. Do not stop Aspirin or Brilinta unless instructed by your cardiologist.  No heavy lifting, strenuous activity, bending, straining or unnecessary stair climbing for 2 weeks. No sex for 1 week.  No driving for 2 days. You may shower 24 hours following procedure but avoid baths and swimming for 1 week. Check groin site for bleeding and/or swelling daily following procedure. Call your doctor/cardiologist immediately for bleeding or swelling or if you have increased/persistent pain or drainage at the site. Follow up with your cardiologist in 1- 2 weeks. You may call [facility] at [phone] or [phone] after office hours and weekends with any questions or concerns following your procedure.      SECONDARY DISCHARGE DIAGNOSES  Diagnosis: Hypertension  Assessment and Plan of Treatment: Continue with your blood pressure medications; eat a heart healthy diet with low salt diet; exercise regularly (consult with your physician or cardiologist first); maintain a heart healthy weight; if you smoke - quit (A resource to help you stop smoking is the [facility] – phone number [phone].); include healthy ways to manage stress. Continue to follow with your primary care physician or cardiologist.    Diagnosis: Diabetes  Assessment and Plan of Treatment: Continue to follow with your primary care MD or your endocrinologist.  Follow a heart healthy diabetic diet. If you check your fingerstick glucose at home, call your MD if it is greater than 250mg/dL on 2 occasions or less than 100mg/dL on 2 occasions. Know signs of low blood sugar, such as: dizziness, shakiness, sweating, confusion, hunger, nervousness-drink 4 ounces apple juice if occurs and call your doctor. Know early signs of high blood sugar, such as: frequent urination, increased thirst, blurry vision, fatigue, headache - call your doctor if this occurs. Follow with other practitioners to care for your diabetes, such as ophthamologist and podiatrist.    Diagnosis: HLD (hyperlipidemia)  Assessment and Plan of Treatment: Continue with your cholesterol medications. Eat a heart healthy diet that is low in saturated fats and salt, and includes whole grains, fruits, vegetables and lean protein; exercise regularly (consult with your physician or cardiologist first); maintain a heart healthy weight; if you smoke - quit (A resource to help you stop smoking is the [facility] – phone number [phone].). Continue to follow with your primary physician or cardiologist.

## 2021-03-05 NOTE — DISCHARGE NOTE PROVIDER - NSDCMRMEDTOKEN_GEN_ALL_CORE_FT
aspirin 81 mg oral delayed release tablet: 1 tab(s) orally once a day  atorvastatin 40 mg oral tablet: 1 tab(s) orally once a day (at bedtime)  famotidine 20 mg oral tablet: 1 tab(s) orally once a day  metFORMIN 850 mg oral tablet: 1 tab(s) orally 2 times a day  RESUME 3/7  metFORMIN 850 mg oral tablet: orally 2 times a day  pantoprazole 40 mg oral delayed release tablet: 1 tab(s) orally once a day  ticagrelor 90 mg oral tablet: 1 tab(s) orally every 12 hours   aspirin 81 mg oral delayed release tablet: 1 tab(s) orally once a day  atorvastatin 40 mg oral tablet: 1 tab(s) orally once a day (at bedtime)  clopidogrel 75 mg oral tablet: 1 tab(s) orally once a day  famotidine 20 mg oral tablet: 1 tab(s) orally once a day  Janumet 50 mg-1000 mg oral tablet: 1 tab(s) orally 2 times a day   metoprolol tartrate 25 mg oral tablet: 0.5 tab(s) orally 2 times a day    aspirin 81 mg oral delayed release tablet: 1 tab(s) orally once a day  atorvastatin 40 mg oral tablet: 1 tab(s) orally once a day (at bedtime)  clopidogrel 75 mg oral tablet: 1 tab(s) orally once a day  famotidine 20 mg oral tablet: 1 tab(s) orally once a day  metFORMIN 1000 mg oral tablet: 1 tab(s) orally 2 times a day   metoprolol tartrate 25 mg oral tablet: 1 tab(s) orally 2 times a day

## 2021-03-05 NOTE — DISCHARGE NOTE PROVIDER - NSDCFUADDINST_GEN_ALL_CORE_FT
Wound Care:   the day AFTER your procedure remove bandage GENTLY, and clean using  mild soap and gentle warm, water stream, pat dry. leave OPEN to air. YOU MAY SHOWER   DO NOT apply lotions, creams, ointments, powder, perfumes to your incision site  DO NOT SOAK your site for 1 week ( no baths, no pools, no tubs, etc...)  Check  your groin daily. A small amount of bruising, and soreness are normal    ACTIVITY: for 24 hours   - DO NOT DRIVE  - DO NOT make any important decisions or sign legal documents   - DO NOT operate heavy machineries  - you may resume sexual activity in 48 hours, unless otherwise instructed by your cardiologist     If your procedure was done through the WRIST: for the NEXT 3DAYS:  - avoid pushing, pulling, with that affected wrist   - avoid repeated movement of that hand and wrist (eg: typing, hammering)  - DO NOT LIFT anything more than 5 lbs     If your procedure was done through the GROIN: for the NEXT 5 DAYS  - Limit climbing stairs, DO NOT soak in bathtub or pool  - no strenuous activities, pushing, pulling, straining  - Do not lift anything 10lbs or heavier     MEDICATION:   take your medications as explained (see discharge paperwork)   If you received a STENT, you will be taking antiplatelet medications to KEEP YOUR STENT OPEN ( eg: Aspirin, Plavix, Brilinta, Effient, etc).  Take as prescribed DO NOT STOP taking them without consulting with your cardiologist first.     Follow heart healthy diet recommended by your doctor, , if you smoke STOP SMOKING ( may call [phone] for center of tobacco control if you need assistance)     CALL your doctor to make appointment in 2 WEEKS     ***CALL YOUR DOCTOR***  if you experience: fever, chills, body aches, or severe pain, swelling, redness, heat or yellow discharge at incision site  If you experience Bleeding or excruciating pain at the procedural site, swelling ( golf ball size) at your procedural site  If you experience CHEST PAIN  If you experience extremity numbness, tingling, temperature change ( of your procedural site)   If you are unable to reach your doctor, you may contact:   -Cardiology Office at [facility] at [phone] or   - [facility] [phone]  - [facility] [phone]

## 2021-03-05 NOTE — DISCHARGE NOTE NURSING/CASE MANAGEMENT/SOCIAL WORK - PATIENT PORTAL LINK FT
You can access the FollowMyHealth Patient Portal offered by [facility] by registering at the following website: [email or web address]. By joining [employer]’s FollowMyHealth portal, you will also be able to view your health information using other applications (apps) compatible with our system.

## 2021-03-05 NOTE — DISCHARGE NOTE PROVIDER - NSDCACTIVITY_GEN_ALL_CORE
Walking - Indoors allowed/No heavy lifting/straining/Walking - Outdoors allowed Showering allowed/Walking - Indoors allowed/No heavy lifting/straining/Walking - Outdoors allowed

## 2022-02-01 NOTE — H&P PST ADULT - VENOUS THROMBOEMBOLISM AGE
Rec'd refill request from pharmacy for refill on venlafaxine. Ok to refill this medication however patient needs a physical for further refills. Left message informing patient of needing a physical for further refills. Patient advised to call office to schedule an appointment. Script sent to pharmacy.    (1) 41-60 years

## 2022-03-25 PROBLEM — E11.9 TYPE 2 DIABETES MELLITUS WITHOUT COMPLICATIONS: Chronic | Status: ACTIVE | Noted: 2021-03-02

## 2022-04-14 ENCOUNTER — APPOINTMENT (OUTPATIENT)
Dept: MRI IMAGING | Facility: CLINIC | Age: 57
End: 2022-04-14
Payer: MEDICAID

## 2022-04-14 ENCOUNTER — OUTPATIENT (OUTPATIENT)
Dept: OUTPATIENT SERVICES | Facility: HOSPITAL | Age: 57
LOS: 1 days | End: 2022-04-14
Payer: MEDICAID

## 2022-04-14 DIAGNOSIS — Z00.8 ENCOUNTER FOR OTHER GENERAL EXAMINATION: ICD-10-CM

## 2022-04-14 PROCEDURE — 70553 MRI BRAIN STEM W/O & W/DYE: CPT

## 2022-04-14 PROCEDURE — A9585: CPT

## 2022-04-14 PROCEDURE — 70553 MRI BRAIN STEM W/O & W/DYE: CPT | Mod: 26

## 2023-05-30 ENCOUNTER — NON-APPOINTMENT (OUTPATIENT)
Age: 58
End: 2023-05-30

## 2023-05-31 ENCOUNTER — NON-APPOINTMENT (OUTPATIENT)
Age: 58
End: 2023-05-31

## 2023-06-13 ENCOUNTER — APPOINTMENT (OUTPATIENT)
Dept: SURGERY | Facility: CLINIC | Age: 58
End: 2023-06-13
Payer: MEDICAID

## 2023-06-13 VITALS
HEART RATE: 76 BPM | OXYGEN SATURATION: 98 % | SYSTOLIC BLOOD PRESSURE: 120 MMHG | DIASTOLIC BLOOD PRESSURE: 76 MMHG | WEIGHT: 215.61 LBS | BODY MASS INDEX: 28.58 KG/M2 | TEMPERATURE: 97 F | HEIGHT: 73 IN

## 2023-06-13 DIAGNOSIS — I10 ESSENTIAL (PRIMARY) HYPERTENSION: ICD-10-CM

## 2023-06-13 DIAGNOSIS — Z83.3 FAMILY HISTORY OF DIABETES MELLITUS: ICD-10-CM

## 2023-06-13 DIAGNOSIS — Z78.9 OTHER SPECIFIED HEALTH STATUS: ICD-10-CM

## 2023-06-13 DIAGNOSIS — E11.9 TYPE 2 DIABETES MELLITUS W/OUT COMPLICATIONS: ICD-10-CM

## 2023-06-13 PROCEDURE — 99204 OFFICE O/P NEW MOD 45 MIN: CPT

## 2023-06-13 RX ORDER — FAMOTIDINE 20 MG/1
20 TABLET, FILM COATED ORAL
Qty: 30 | Refills: 5 | Status: ACTIVE | COMMUNITY

## 2023-06-13 RX ORDER — METFORMIN ER 500 MG 500 MG/1
500 TABLET ORAL DAILY
Refills: 0 | Status: ACTIVE | COMMUNITY

## 2023-06-13 RX ORDER — ATORVASTATIN CALCIUM 40 MG/1
40 TABLET, FILM COATED ORAL DAILY
Refills: 0 | Status: ACTIVE | COMMUNITY

## 2023-06-13 RX ORDER — PNV NO.153/FA/OM3/DHA/EPA/FISH 400-35-25
125 MCG TABLET,CHEWABLE ORAL WEEKLY
Refills: 0 | Status: ACTIVE | COMMUNITY

## 2023-06-13 RX ORDER — METOPROLOL TARTRATE 25 MG/1
25 TABLET, FILM COATED ORAL
Qty: 60 | Refills: 3 | Status: ACTIVE | COMMUNITY

## 2023-06-13 RX ORDER — PNV NO.95/FERROUS FUM/FOLIC AC 28MG-0.8MG
TABLET ORAL DAILY
Refills: 0 | Status: ACTIVE | COMMUNITY

## 2023-06-13 RX ORDER — CLOPIDOGREL BISULFATE 75 MG/1
75 TABLET, FILM COATED ORAL DAILY
Qty: 90 | Refills: 2 | Status: ACTIVE | COMMUNITY

## 2023-06-13 NOTE — PHYSICAL EXAM
[Respiratory Effort] : normal respiratory effort [Normal Rate and Rhythm] : normal rate and rhythm [No HSM] : no hepatosplenomegaly [Tender] : was nontender [Enlarged] : not enlarged [Alert] : alert [Oriented to Person] : oriented to person [Oriented to Place] : oriented to place [Oriented to Time] : oriented to time [Anxious] : anxious [de-identified] : Appears well, no acute distress, ambulates easily into office and assumes examination table without need of assistance.  [de-identified] : Normocephalic and atraumatic.  [de-identified] : Supple with full range of motion.  [FreeTextEntry1] : No cervical, supraclavicular, axillary or inguinal adenopathy.  [de-identified] : Deferred.  [de-identified] : Soft and non tense and non tender. [de-identified] : Deferred.  [de-identified] : Deferred.  [de-identified] : Grossly symmetric and within normal limits without motor or sensory deficits.  [de-identified] : Soft, rubbery, semi-mobile soft tissue lesion ~ 3 cm in size within superficial soft tissues of right upper back above level of scapula.  Grossly suggestive of cystic lesion/ sebaceous cyst, though no kris central punctum. [de-identified] : though quite appropriately so and pleasant/ appreciative overall...

## 2023-06-13 NOTE — REVIEW OF SYSTEMS
[Feeling Poorly] : not feeling poorly [Feeling Tired] : not feeling tired [As Noted in HPI] : as noted in HPI [Anxiety] : anxiety [Depression] : no depression [Negative] : Heme/Lymph [de-identified] : regarding this issue and visit...

## 2023-06-13 NOTE — HISTORY OF PRESENT ILLNESS
[de-identified] : Very pleasant gentleman presenting to the office with his family.\par Mr. Chong arrives under the recommendation of his PMD.\par He reports a rather longstanding, but slowly progressive lesion of the skin/ soft tissue of the right upper back.\par Mr. Chong reports that it was initially just palpable but is now visible.\par He does report some discomfort to incidental contact or minor trauma.  However, he denies any odor or drainage...

## 2023-06-13 NOTE — PLAN
[FreeTextEntry1] : Symptomatic right upper back lesion.  \par \par Given the associated discomfort, difficulty with incidental contact or minor trauma, the progression in size while under observation and the ultimately unknown or unclear underlying pathology as well as the patient’s understandable anxiety, at least consideration of operative intervention does seem indicated and appropriate.  \par \par The risks, benefits and nature of the operative intervention have been reviewed with Mr. CORNELIUS at length, including but not limited to the approximate location and size of the expected or typical operative incision, the probability of a visible post operative “scar” or local skin changes and the possibilities of bleeding, infection, seroma or future lesion recurrence.   Mr. CORNELIUS  is aware that future recommendations or procedures may be required pending the operative findings and the final Pathology report.  \par \par Mr. CORNELIUS  demonstrates good insight, but wishes to consider further.  While there are further specific concerns presently, I have encouraged Mr. CORNELIUS  to follow-up with me and my office at any time in the interim to discuss any future questions.  \par \par Our plan will be for an elective outpatient procedure following any standard pre-surgical testing or medical clearance as required by the appropriate facility.\par \par Given his history, will ask for cardiac clearance and holding of Plavix if possible/ appropriate.\par \par Please note that more than 50% of face to face time was spent in counseling and coordination of care.\par \par

## 2023-11-08 ENCOUNTER — APPOINTMENT (OUTPATIENT)
Dept: SURGERY | Facility: CLINIC | Age: 58
End: 2023-11-08
Payer: MEDICAID

## 2023-11-08 VITALS
DIASTOLIC BLOOD PRESSURE: 70 MMHG | TEMPERATURE: 97.1 F | OXYGEN SATURATION: 98 % | HEART RATE: 86 BPM | WEIGHT: 211 LBS | BODY MASS INDEX: 27.96 KG/M2 | HEIGHT: 73 IN | SYSTOLIC BLOOD PRESSURE: 114 MMHG

## 2023-11-08 DIAGNOSIS — D48.7 NEOPLASM OF UNCERTAIN BEHAVIOR OF OTHER SPECIFIED SITES: ICD-10-CM

## 2023-11-08 PROCEDURE — 99214 OFFICE O/P EST MOD 30 MIN: CPT | Mod: 25

## 2023-11-08 PROCEDURE — 10061 I&D ABSCESS COMP/MULTIPLE: CPT

## 2023-11-10 ENCOUNTER — APPOINTMENT (OUTPATIENT)
Dept: SURGERY | Facility: CLINIC | Age: 58
End: 2023-11-10
Payer: MEDICAID

## 2023-11-10 VITALS
HEIGHT: 73 IN | TEMPERATURE: 97 F | SYSTOLIC BLOOD PRESSURE: 118 MMHG | BODY MASS INDEX: 28.36 KG/M2 | WEIGHT: 214 LBS | HEART RATE: 64 BPM | DIASTOLIC BLOOD PRESSURE: 72 MMHG | OXYGEN SATURATION: 98 %

## 2023-11-10 PROCEDURE — 99024 POSTOP FOLLOW-UP VISIT: CPT

## 2023-11-14 ENCOUNTER — APPOINTMENT (OUTPATIENT)
Dept: SURGERY | Facility: CLINIC | Age: 58
End: 2023-11-14
Payer: MEDICAID

## 2023-11-14 VITALS
HEART RATE: 68 BPM | BODY MASS INDEX: 28.36 KG/M2 | HEIGHT: 73 IN | TEMPERATURE: 97 F | OXYGEN SATURATION: 98 % | WEIGHT: 214 LBS | SYSTOLIC BLOOD PRESSURE: 116 MMHG | DIASTOLIC BLOOD PRESSURE: 74 MMHG

## 2023-11-14 PROCEDURE — 99024 POSTOP FOLLOW-UP VISIT: CPT

## 2023-11-22 ENCOUNTER — APPOINTMENT (OUTPATIENT)
Dept: SURGERY | Facility: CLINIC | Age: 58
End: 2023-11-22
Payer: MEDICAID

## 2023-11-22 VITALS
WEIGHT: 214 LBS | SYSTOLIC BLOOD PRESSURE: 114 MMHG | HEART RATE: 64 BPM | TEMPERATURE: 96.7 F | HEIGHT: 73 IN | OXYGEN SATURATION: 98 % | DIASTOLIC BLOOD PRESSURE: 74 MMHG | BODY MASS INDEX: 28.36 KG/M2

## 2023-11-22 DIAGNOSIS — L08.9 SEBACEOUS CYST: ICD-10-CM

## 2023-11-22 DIAGNOSIS — L72.3 SEBACEOUS CYST: ICD-10-CM

## 2023-11-22 PROCEDURE — 99213 OFFICE O/P EST LOW 20 MIN: CPT

## 2023-11-28 ENCOUNTER — APPOINTMENT (OUTPATIENT)
Dept: SURGERY | Facility: HOSPITAL | Age: 58
End: 2023-11-28

## 2023-11-28 PROBLEM — L72.3 INFECTED SEBACEOUS CYST: Status: RESOLVED | Noted: 2023-11-08 | Resolved: 2023-11-28

## 2023-12-06 ENCOUNTER — APPOINTMENT (OUTPATIENT)
Dept: SURGERY | Facility: CLINIC | Age: 58
End: 2023-12-06
Payer: MEDICAID

## 2023-12-06 VITALS
TEMPERATURE: 96.7 F | DIASTOLIC BLOOD PRESSURE: 72 MMHG | WEIGHT: 214 LBS | HEART RATE: 86 BPM | HEIGHT: 73 IN | BODY MASS INDEX: 28.36 KG/M2 | SYSTOLIC BLOOD PRESSURE: 112 MMHG | OXYGEN SATURATION: 98 %

## 2023-12-06 DIAGNOSIS — Z01.818 ENCOUNTER FOR OTHER PREPROCEDURAL EXAMINATION: ICD-10-CM

## 2023-12-06 DIAGNOSIS — Z98.890 OTHER SPECIFIED POSTPROCEDURAL STATES: ICD-10-CM

## 2023-12-06 DIAGNOSIS — Z51.89 ENCOUNTER FOR OTHER SPECIFIED AFTERCARE: ICD-10-CM

## 2023-12-06 PROCEDURE — 99213 OFFICE O/P EST LOW 20 MIN: CPT

## 2023-12-13 ENCOUNTER — APPOINTMENT (OUTPATIENT)
Dept: SURGERY | Facility: CLINIC | Age: 58
End: 2023-12-13